# Patient Record
Sex: MALE | Race: WHITE | NOT HISPANIC OR LATINO | Employment: OTHER | URBAN - METROPOLITAN AREA
[De-identification: names, ages, dates, MRNs, and addresses within clinical notes are randomized per-mention and may not be internally consistent; named-entity substitution may affect disease eponyms.]

---

## 2023-02-22 LAB
ESTIMATED AVERAGE GLUCOSE FOR HBA1C: 131 MG/DL
HEMOGLOBIN A1C/HEMOGLOBIN TOTAL IN BLOOD: 6.2 %
TRIGLYCERIDE (MG/DL) IN SER/PLAS: 176 MG/DL (ref 0–149)

## 2023-08-22 LAB
ALANINE AMINOTRANSFERASE (SGPT) (U/L) IN SER/PLAS: 27 U/L (ref 10–52)
ALBUMIN (G/DL) IN SER/PLAS: 4.3 G/DL (ref 3.4–5)
ALKALINE PHOSPHATASE (U/L) IN SER/PLAS: 43 U/L (ref 33–136)
ANION GAP IN SER/PLAS: 16 MMOL/L (ref 10–20)
ASPARTATE AMINOTRANSFERASE (SGOT) (U/L) IN SER/PLAS: 26 U/L (ref 9–39)
BASOPHILS (10*3/UL) IN BLOOD BY AUTOMATED COUNT: 0.04 X10E9/L (ref 0–0.1)
BASOPHILS/100 LEUKOCYTES IN BLOOD BY AUTOMATED COUNT: 0.5 % (ref 0–2)
BILIRUBIN TOTAL (MG/DL) IN SER/PLAS: 0.8 MG/DL (ref 0–1.2)
CALCIDIOL (25 OH VITAMIN D3) (NG/ML) IN SER/PLAS: 41 NG/ML
CALCIUM (MG/DL) IN SER/PLAS: 9.6 MG/DL (ref 8.6–10.6)
CARBON DIOXIDE, TOTAL (MMOL/L) IN SER/PLAS: 23 MMOL/L (ref 21–32)
CHLORIDE (MMOL/L) IN SER/PLAS: 104 MMOL/L (ref 98–107)
CHOLESTEROL (MG/DL) IN SER/PLAS: 179 MG/DL (ref 0–199)
CHOLESTEROL IN HDL (MG/DL) IN SER/PLAS: 41.4 MG/DL
CHOLESTEROL/HDL RATIO: 4.3
CREATININE (MG/DL) IN SER/PLAS: 0.84 MG/DL (ref 0.5–1.3)
EOSINOPHILS (10*3/UL) IN BLOOD BY AUTOMATED COUNT: 0.18 X10E9/L (ref 0–0.4)
EOSINOPHILS/100 LEUKOCYTES IN BLOOD BY AUTOMATED COUNT: 2.4 % (ref 0–6)
ERYTHROCYTE DISTRIBUTION WIDTH (RATIO) BY AUTOMATED COUNT: 14.9 % (ref 11.5–14.5)
ERYTHROCYTE MEAN CORPUSCULAR HEMOGLOBIN CONCENTRATION (G/DL) BY AUTOMATED: 33.6 G/DL (ref 32–36)
ERYTHROCYTE MEAN CORPUSCULAR VOLUME (FL) BY AUTOMATED COUNT: 92 FL (ref 80–100)
ERYTHROCYTES (10*6/UL) IN BLOOD BY AUTOMATED COUNT: 4.78 X10E12/L (ref 4.5–5.9)
ESTIMATED AVERAGE GLUCOSE FOR HBA1C: 128 MG/DL
GFR MALE: 88 ML/MIN/1.73M2
GLUCOSE (MG/DL) IN SER/PLAS: 105 MG/DL (ref 74–99)
HEMATOCRIT (%) IN BLOOD BY AUTOMATED COUNT: 44.1 % (ref 41–52)
HEMOGLOBIN (G/DL) IN BLOOD: 14.8 G/DL (ref 13.5–17.5)
HEMOGLOBIN A1C/HEMOGLOBIN TOTAL IN BLOOD: 6.1 %
IMMATURE GRANULOCYTES/100 LEUKOCYTES IN BLOOD BY AUTOMATED COUNT: 1.6 % (ref 0–0.9)
LDL: 89 MG/DL (ref 0–99)
LEUKOCYTES (10*3/UL) IN BLOOD BY AUTOMATED COUNT: 7.6 X10E9/L (ref 4.4–11.3)
LYMPHOCYTES (10*3/UL) IN BLOOD BY AUTOMATED COUNT: 1.59 X10E9/L (ref 0.8–3)
LYMPHOCYTES/100 LEUKOCYTES IN BLOOD BY AUTOMATED COUNT: 21 % (ref 13–44)
MONOCYTES (10*3/UL) IN BLOOD BY AUTOMATED COUNT: 0.53 X10E9/L (ref 0.05–0.8)
MONOCYTES/100 LEUKOCYTES IN BLOOD BY AUTOMATED COUNT: 7 % (ref 2–10)
NEUTROPHILS (10*3/UL) IN BLOOD BY AUTOMATED COUNT: 5.12 X10E9/L (ref 1.6–5.5)
NEUTROPHILS/100 LEUKOCYTES IN BLOOD BY AUTOMATED COUNT: 67.5 % (ref 40–80)
NON HDL CHOLESTEROL: 138 MG/DL
NRBC (PER 100 WBCS) BY AUTOMATED COUNT: 0 /100 WBC (ref 0–0)
PLATELETS (10*3/UL) IN BLOOD AUTOMATED COUNT: 209 X10E9/L (ref 150–450)
POTASSIUM (MMOL/L) IN SER/PLAS: 4.6 MMOL/L (ref 3.5–5.3)
PROTEIN TOTAL: 7.2 G/DL (ref 6.4–8.2)
SODIUM (MMOL/L) IN SER/PLAS: 138 MMOL/L (ref 136–145)
THYROTROPIN (MIU/L) IN SER/PLAS BY DETECTION LIMIT <= 0.05 MIU/L: 1.27 MIU/L (ref 0.44–3.98)
TRIGLYCERIDE (MG/DL) IN SER/PLAS: 245 MG/DL (ref 0–149)
URATE (MG/DL) IN SER/PLAS: 7.2 MG/DL (ref 4–7.5)
UREA NITROGEN (MG/DL) IN SER/PLAS: 23 MG/DL (ref 6–23)
VLDL: 49 MG/DL (ref 0–40)

## 2023-10-09 ENCOUNTER — LAB (OUTPATIENT)
Dept: LAB | Facility: LAB | Age: 78
End: 2023-10-09
Payer: MEDICARE

## 2023-10-09 DIAGNOSIS — I77.810 THORACIC AORTIC ECTASIA (CMS-HCC): Primary | ICD-10-CM

## 2023-10-09 LAB
BUN SERPL-MCNC: 22 MG/DL (ref 6–23)
CREAT SERPL-MCNC: 0.88 MG/DL (ref 0.5–1.3)
GFR SERPL CREATININE-BSD FRML MDRD: 87 ML/MIN/1.73M*2

## 2023-10-09 PROCEDURE — 82565 ASSAY OF CREATININE: CPT

## 2023-10-09 PROCEDURE — 36415 COLL VENOUS BLD VENIPUNCTURE: CPT

## 2023-10-09 PROCEDURE — 84520 ASSAY OF UREA NITROGEN: CPT

## 2023-10-19 ENCOUNTER — HOSPITAL ENCOUNTER (OUTPATIENT)
Dept: RADIOLOGY | Facility: HOSPITAL | Age: 78
Discharge: HOME | End: 2023-10-19
Payer: MEDICARE

## 2023-10-19 DIAGNOSIS — I77.810 THORACIC AORTIC ECTASIA (CMS-HCC): ICD-10-CM

## 2023-10-19 PROCEDURE — 71275 CT ANGIOGRAPHY CHEST: CPT | Performed by: INTERNAL MEDICINE

## 2023-10-19 PROCEDURE — 2550000001 HC RX 255 CONTRASTS: Performed by: INTERNAL MEDICINE

## 2023-10-19 PROCEDURE — 71275 CT ANGIOGRAPHY CHEST: CPT

## 2023-10-19 RX ADMIN — IOHEXOL 90 ML: 350 INJECTION, SOLUTION INTRAVENOUS at 15:56

## 2023-10-31 DIAGNOSIS — I77.810 AORTIC ECTASIA, THORACIC (CMS-HCC): ICD-10-CM

## 2023-10-31 DIAGNOSIS — I27.20 PULMONARY HYPERTENSION (MULTI): Primary | ICD-10-CM

## 2023-11-08 DIAGNOSIS — N28.1 RENAL CYST, RIGHT: ICD-10-CM

## 2023-11-08 DIAGNOSIS — K76.89 LIVER CYST: Primary | ICD-10-CM

## 2023-11-14 ENCOUNTER — ANCILLARY PROCEDURE (OUTPATIENT)
Dept: RADIOLOGY | Facility: CLINIC | Age: 78
End: 2023-11-14
Payer: MEDICARE

## 2023-11-14 DIAGNOSIS — N28.1 RENAL CYST, RIGHT: ICD-10-CM

## 2023-11-14 DIAGNOSIS — K76.89 LIVER CYST: ICD-10-CM

## 2023-11-14 PROCEDURE — 76705 ECHO EXAM OF ABDOMEN: CPT | Performed by: RADIOLOGY

## 2023-11-14 PROCEDURE — 76705 ECHO EXAM OF ABDOMEN: CPT

## 2023-11-21 DIAGNOSIS — N28.1 ACQUIRED COMPLEX CYST OF KIDNEY: Primary | ICD-10-CM

## 2023-11-21 DIAGNOSIS — K76.9 LIVER LESION, RIGHT LOBE: ICD-10-CM

## 2023-11-28 ENCOUNTER — LAB (OUTPATIENT)
Dept: LAB | Facility: LAB | Age: 78
End: 2023-11-28
Payer: MEDICARE

## 2023-11-28 DIAGNOSIS — K76.9 LIVER LESION, RIGHT LOBE: ICD-10-CM

## 2023-11-28 DIAGNOSIS — N28.1 ACQUIRED COMPLEX CYST OF KIDNEY: ICD-10-CM

## 2023-11-28 LAB
CREAT SERPL-MCNC: 1.09 MG/DL (ref 0.5–1.3)
GFR SERPL CREATININE-BSD FRML MDRD: 69 ML/MIN/1.73M*2

## 2023-11-28 PROCEDURE — 36415 COLL VENOUS BLD VENIPUNCTURE: CPT

## 2023-11-28 PROCEDURE — 82565 ASSAY OF CREATININE: CPT

## 2023-12-07 ENCOUNTER — OFFICE VISIT (OUTPATIENT)
Dept: PRIMARY CARE | Facility: CLINIC | Age: 78
End: 2023-12-07
Payer: MEDICARE

## 2023-12-07 ENCOUNTER — LAB (OUTPATIENT)
Dept: LAB | Facility: LAB | Age: 78
End: 2023-12-07
Payer: MEDICARE

## 2023-12-07 VITALS
HEIGHT: 68 IN | BODY MASS INDEX: 44.67 KG/M2 | SYSTOLIC BLOOD PRESSURE: 128 MMHG | HEART RATE: 65 BPM | WEIGHT: 294.75 LBS | DIASTOLIC BLOOD PRESSURE: 70 MMHG | RESPIRATION RATE: 18 BRPM | OXYGEN SATURATION: 95 %

## 2023-12-07 DIAGNOSIS — E79.0 HYPERURICEMIA: ICD-10-CM

## 2023-12-07 DIAGNOSIS — C61 PROSTATE CANCER (MULTI): ICD-10-CM

## 2023-12-07 DIAGNOSIS — N39.41 URINARY INCONTINENCE, URGE: ICD-10-CM

## 2023-12-07 DIAGNOSIS — I27.20 PULMONARY HYPERTENSION (MULTI): ICD-10-CM

## 2023-12-07 DIAGNOSIS — R73.9 HYPERGLYCEMIA: ICD-10-CM

## 2023-12-07 DIAGNOSIS — I10 PRIMARY HYPERTENSION: Primary | ICD-10-CM

## 2023-12-07 PROCEDURE — 1159F MED LIST DOCD IN RCRD: CPT | Performed by: INTERNAL MEDICINE

## 2023-12-07 PROCEDURE — 3078F DIAST BP <80 MM HG: CPT | Performed by: INTERNAL MEDICINE

## 2023-12-07 PROCEDURE — 3074F SYST BP LT 130 MM HG: CPT | Performed by: INTERNAL MEDICINE

## 2023-12-07 PROCEDURE — 99214 OFFICE O/P EST MOD 30 MIN: CPT | Performed by: INTERNAL MEDICINE

## 2023-12-07 PROCEDURE — 84153 ASSAY OF PSA TOTAL: CPT

## 2023-12-07 PROCEDURE — 84154 ASSAY OF PSA FREE: CPT

## 2023-12-07 PROCEDURE — 36415 COLL VENOUS BLD VENIPUNCTURE: CPT

## 2023-12-07 RX ORDER — CLINDAMYCIN HYDROCHLORIDE 300 MG/1
CAPSULE ORAL
COMMUNITY
End: 2024-02-13

## 2023-12-07 RX ORDER — CHOLECALCIFEROL (VITAMIN D3) 25 MCG
1 TABLET ORAL 2 TIMES DAILY
COMMUNITY
Start: 2015-02-25

## 2023-12-07 RX ORDER — MULTIVITAMIN
TABLET ORAL
COMMUNITY

## 2023-12-07 RX ORDER — SILDENAFIL 100 MG/1
TABLET, FILM COATED ORAL
COMMUNITY
Start: 2018-08-21

## 2023-12-07 RX ORDER — METFORMIN HYDROCHLORIDE 500 MG/1
TABLET, EXTENDED RELEASE ORAL
COMMUNITY
End: 2024-04-17 | Stop reason: SDUPTHER

## 2023-12-07 ASSESSMENT — COLUMBIA-SUICIDE SEVERITY RATING SCALE - C-SSRS
1. IN THE PAST MONTH, HAVE YOU WISHED YOU WERE DEAD OR WISHED YOU COULD GO TO SLEEP AND NOT WAKE UP?: NO
6. HAVE YOU EVER DONE ANYTHING, STARTED TO DO ANYTHING, OR PREPARED TO DO ANYTHING TO END YOUR LIFE?: NO
2. HAVE YOU ACTUALLY HAD ANY THOUGHTS OF KILLING YOURSELF?: NO

## 2023-12-07 ASSESSMENT — ENCOUNTER SYMPTOMS
PALPITATIONS: 0
LOSS OF SENSATION IN FEET: 0
OCCASIONAL FEELINGS OF UNSTEADINESS: 0
DEPRESSION: 0

## 2023-12-07 ASSESSMENT — PATIENT HEALTH QUESTIONNAIRE - PHQ9
2. FEELING DOWN, DEPRESSED OR HOPELESS: NOT AT ALL
1. LITTLE INTEREST OR PLEASURE IN DOING THINGS: NOT AT ALL
SUM OF ALL RESPONSES TO PHQ9 QUESTIONS 1 AND 2: 0

## 2023-12-07 NOTE — PATIENT INSTRUCTIONS
Continue working on control carbs to reduce weight and triglycerides, complete MRIs call office for results, see cardiologist, return here in

## 2023-12-07 NOTE — PROGRESS NOTES
"Subjective   Patient ID: Joseph Hayden is a 78 y.o. male who presents for Hypertension., vaccines, results of ct angio    Continue watching his amount of carbs. Wearing stockings that have improved his bialteral edema  We discussed labs from last visit, continue metformin 1500 mg , hmag1 stable  Uric acid stable on 200 mg of allopurinol  Base in findings of ct angio and pulmonary hypertension he will see cardiologist in 2 weeks with dr Horne.He wonders if this finding was related to covid vaccine received in  October before her angio.  Also he had subsequent abdominal ultrasound that showed renal cyst and liver lesion, follow up mri of kidney and liver coming up next week.  Wants opinion about RSV  He uses urinary pad , mostly at night, no urinary retention,  no flow changes. No rectal bleeding, BM daily soft.Nocturia about 4 x week      Review of Systems   Respiratory:          Pt denies sob at exertion, orthopnea, or cough   Cardiovascular:  Negative for chest pain and palpitations.   All other systems reviewed and are negative.      Objective   /70 (BP Location: Left arm, Patient Position: Sitting, BP Cuff Size: Adult)   Pulse 65   Resp 18   Ht 1.727 m (5' 8\")   Wt 134 kg (294 lb 12.1 oz)   SpO2 95%   BMI 44.82 kg/m²     Physical Exam  Eyes- Conjunctiva clear  HEENT no sinus tenderness  Neck-no thyromegaly, thick neck   Cardiac- regular rate and rhythm, no murmurs  Lung- clear to auscultation  GI- present  bowel sounds, nontender, no rebound  MSK- no deformities  Extremities- wearing compressin stockings, still present edema,  Neuro- non focal, oriented x 3  Psychiatric- pleasant, well groomed, no hallucinations      Assessment/Plan   Problem List Items Addressed This Visit             ICD-10-CM       Cardiac and Vasculature    Pulmonary hypertension (CMS/HCC) I27.20     Pending cardilogy eval, pt declined sleep study in the past         Primary hypertension - Primary I10     Control on current med   "          Endocrine/Metabolic    Hyperglycemia R73.9       Genitourinary and Reproductive    Urinary incontinence, urge N39.41     Declines going back to urolgist for further meds            Hematology and Neoplasia    Prostate cancer (CMS/HCC) C61    Relevant Orders    PSA, total and free       Musculoskeletal and Injuries    Hyperuricemia E79.0     Continue current dose

## 2023-12-10 LAB
PSA FREE MFR SERPL: <33 %
PSA FREE SERPL-MCNC: <0.1 NG/ML
PSA SERPL IA-MCNC: 0.3 NG/ML (ref 0–4)

## 2023-12-12 ENCOUNTER — HOSPITAL ENCOUNTER (OUTPATIENT)
Dept: RADIOLOGY | Facility: HOSPITAL | Age: 78
Discharge: HOME | End: 2023-12-12
Payer: MEDICARE

## 2023-12-12 DIAGNOSIS — N28.1 ACQUIRED COMPLEX CYST OF KIDNEY: ICD-10-CM

## 2023-12-12 DIAGNOSIS — K76.9 LIVER LESION, RIGHT LOBE: ICD-10-CM

## 2023-12-12 PROCEDURE — 2550000001 HC RX 255 CONTRASTS: Performed by: INTERNAL MEDICINE

## 2023-12-12 PROCEDURE — 74183 MRI ABD W/O CNTR FLWD CNTR: CPT | Performed by: RADIOLOGY

## 2023-12-12 PROCEDURE — A9575 INJ GADOTERATE MEGLUMI 0.1ML: HCPCS | Performed by: INTERNAL MEDICINE

## 2023-12-12 PROCEDURE — 74183 MRI ABD W/O CNTR FLWD CNTR: CPT

## 2023-12-12 RX ORDER — GADOTERATE MEGLUMINE 376.9 MG/ML
15 INJECTION INTRAVENOUS
Status: COMPLETED | OUTPATIENT
Start: 2023-12-12 | End: 2023-12-12

## 2023-12-12 RX ADMIN — GADOTERATE MEGLUMINE 26 ML: 376.9 INJECTION INTRAVENOUS at 17:30

## 2023-12-19 ENCOUNTER — TELEPHONE (OUTPATIENT)
Dept: PRIMARY CARE | Facility: CLINIC | Age: 78
End: 2023-12-19

## 2023-12-19 ENCOUNTER — OFFICE VISIT (OUTPATIENT)
Dept: CARDIOLOGY | Facility: CLINIC | Age: 78
End: 2023-12-19
Payer: MEDICARE

## 2023-12-19 VITALS
OXYGEN SATURATION: 94 % | DIASTOLIC BLOOD PRESSURE: 83 MMHG | HEART RATE: 64 BPM | WEIGHT: 300 LBS | BODY MASS INDEX: 43.05 KG/M2 | SYSTOLIC BLOOD PRESSURE: 156 MMHG

## 2023-12-19 DIAGNOSIS — I48.91 ATRIAL FIBRILLATION, UNSPECIFIED TYPE (MULTI): ICD-10-CM

## 2023-12-19 DIAGNOSIS — R94.31 ABNORMAL EKG: Primary | ICD-10-CM

## 2023-12-19 DIAGNOSIS — I77.810 AORTIC ECTASIA, THORACIC (CMS-HCC): ICD-10-CM

## 2023-12-19 PROCEDURE — 93000 ELECTROCARDIOGRAM COMPLETE: CPT | Performed by: INTERNAL MEDICINE

## 2023-12-19 PROCEDURE — 99204 OFFICE O/P NEW MOD 45 MIN: CPT | Performed by: INTERNAL MEDICINE

## 2023-12-19 PROCEDURE — 1159F MED LIST DOCD IN RCRD: CPT | Performed by: INTERNAL MEDICINE

## 2023-12-19 PROCEDURE — 3077F SYST BP >= 140 MM HG: CPT | Performed by: INTERNAL MEDICINE

## 2023-12-19 PROCEDURE — 3079F DIAST BP 80-89 MM HG: CPT | Performed by: INTERNAL MEDICINE

## 2023-12-19 NOTE — PATIENT INSTRUCTIONS
Today reviewed ECG shows atrial fibrillation.  Increased stroke risk and recommend Eliquis.  We will obtain echocardiogram and recommend elective cardioversion in about 1 month after being on Eliquis.  Please do not miss any doses.    We reviewed your recent CT scan in which the right pulmonary artery is enlarged, although this is nonspecific and probably body habitus related.  Echocardiogram may give us some more insight into whether there is any right-sided enlargement    Follow-up TBD based after cardioversion

## 2023-12-19 NOTE — PROGRESS NOTES
Primary Care Physician: Rochelle Pinzon MD  Date of Visit: 2023  2:00 PM EST      Chief Complaint:   Pt referred by Dr. Pinzon for ?pulmonary HTN     HPI / Summary:   Joseph Hayden is a 78 y.o. male Htn, gout, obesity referred regarding abnormal CT scan and concern for pulmonary hypertension.  Right pulmonary artery measuring around 3 cm on CT chest.  Not noted previously.  Denies any chest pain or shortness of breath.  No lightheadedness, dizziness, presyncope or syncope.  Ambulation limited by body habitus and cane as well as musculoskeletal complaints.    Of note EKG today showing atrial fibrillation      Exercise: none regularly    CT angio chest : ascending aorta 3.9 cm. Isolated dilation of right pulmonary artery  CT angio chest : ascending aorta 3.9 cm.   CT chest : ascending aorta 4 cm  CT chest : ascending aorta 4.1 cm      Last Cardiology Tests:  EC2023: A-fib with controlled rate, low voltage  2019: NSR with HR 89 bpm, long first-degree AVB ( MS) with some PACs    Echo 10/11/2022:  EF 60%, stage I DD    Cath:  N/a    Stress Test:  N/a    Cardiac Imaging:      Past Medical History:  Past Medical History:   Diagnosis Date    Abrasion of right middle finger, initial encounter 2021    Abrasion of right middle finger    Aftercare following joint replacement surgery 2019    Aftercare following right hip joint replacement surgery    Cellulitis of unspecified part of limb 2018    Cellulitis of forearm    Encounter for general adult medical examination without abnormal findings 2019    Encounter for preventive health examination    Encounter for immunization 2021    Encounter for immunization    Other conditions influencing health status 2019    Hamstring sprain, left, initial encounter    Other specified disorders of bone, shoulder 2017    Pain of left clavicle    Pain in unspecified hip 2018    Hip pain    Personal  Pt arrives with requesting STD testing due to having sores in her noel area. Pt states they started two days ago, no known exposure to STDs, denies them being painful.     Triage Assessment     Row Name 09/03/22 1030       Triage Assessment (Adult)    Airway WDL WDL       Respiratory WDL    Respiratory WDL WDL       Skin Circulation/Temperature WDL    Skin Circulation/Temperature WDL X  reports sores in noel area       Cardiac WDL    Cardiac WDL WDL       Peripheral/Neurovascular WDL    Peripheral Neurovascular WDL WDL       Cognitive/Neuro/Behavioral WDL    Cognitive/Neuro/Behavioral WDL WDL               history of diseases of the skin and subcutaneous tissue 10/12/2016    History of contact dermatitis    Personal history of other diseases of the digestive system 02/15/2018    History of rectal bleeding    Personal history of other diseases of the musculoskeletal system and connective tissue 09/15/2015    History of joint pain    Personal history of other diseases of urinary system 06/27/2019    History of hematuria    Rash and other nonspecific skin eruption 10/03/2014    Erythematous rash    Unilateral primary osteoarthritis, right hip 01/25/2019    Osteoarthritis of right hip    Urinary tract infection, site not specified 06/27/2019    Acute UTI        Past Surgical History:  Past Surgical History:   Procedure Laterality Date    COLONOSCOPY  05/28/2013    Complete Colonoscopy          Social History:   reports that he has quit smoking. His smoking use included cigarettes. He has never used smokeless tobacco.     Family History:  family history is not on file.      Allergies:  Allergies   Allergen Reactions    Penicillins Other       Outpatient Medications:  Current Outpatient Medications   Medication Instructions    allopurinol (ZYLOPRIM) 200 mg, oral, Daily    amLODIPine (NORVASC) 5 mg, oral, Daily, as directed    cholecalciferol (Vitamin D-3) 25 MCG (1000 UT) tablet 1 tablet, oral, 2 times daily    clindamycin (Cleocin) 300 mg capsule TAKE 2 CAPSULES BY MOUTH 1 HOUR PRIOR TO DENTAL PROCEDURE.    hydroCHLOROthiazide (HYDRODIURIL) 12.5 mg, oral, Daily    lisinopril 20 mg, oral, Daily    metFORMIN  mg 24 hr tablet TAKE ONE TABLET BY MOUTH daily in the MORNING and then take two tablets in the evening    multivitamin tablet oral    sildenafil (Viagra) 100 mg tablet oral       Review of Systems:  A complete 10 point ROS was performed and is negative except for HPI    Physical Exam:  GENERAL: alert, cooperative, pleasant, in no acute distress  SKIN: warm, dry, no rash.  NECK: no JVD, no DILIA  CARDIAC: irregular  "rate and rhythm with no rubs, murmurs, or gallops  CHEST: Normal respiratory efforts, lungs clear to auscultation bilaterally.  ABDOMEN: soft, nontender, nondistended  EXTREMITIES: no edema  NEURO: Alert and oriented x 3.  Grossly normal.  Moves all 4 extremities.    Vitals:    12/19/23 1400   BP: 156/83   BP Location: Left arm   Pulse: 64   SpO2: 94%   Weight: 136 kg (300 lb)     Wt Readings from Last 5 Encounters:   12/07/23 134 kg (294 lb 12.1 oz)   12/12/23 132 kg (292 lb)   05/23/23 135 kg (297 lb)   02/22/23 136 kg (299 lb 6 oz)   10/11/22 135 kg (298 lb)     Body mass index is 43.05 kg/m².        Last Labs:  CMP:  Recent Labs     11/28/23  0845 10/09/23  1411 08/22/23  1419 07/13/22  1210 09/15/21  1205   NA  --   --  138 138 137   K  --   --  4.6 4.6 4.5   CL  --   --  104 105 105   CO2  --   --  23 23 23   ANIONGAP  --   --  16 15 14   BUN  --  22 23 24* 20   CREATININE 1.09 0.88 0.84 0.93 0.93   EGFR 69 87  --   --   --    GLUCOSE  --   --  105* 123* 122*     Recent Labs     08/22/23  1419 07/13/22  1210 09/15/21  1205   ALBUMIN 4.3 4.2 4.3   ALKPHOS 43 37 41   ALT 27 40 38   AST 26 35 33   BILITOT 0.8 0.7 0.7     CBC:  Recent Labs     08/22/23  1419 07/13/22  1210 09/15/21  1205   WBC 7.6 6.9 7.2   HGB 14.8 14.1 14.2   HCT 44.1 42.5 41.9    217 213   MCV 92 90 91     COAG:   Recent Labs     02/04/19  1033   INR 1.1     ENDO:  Recent Labs     08/22/23  1419 02/22/23  1310 07/13/22  1210 01/26/22  1240 09/15/21  1205 04/23/21  1145 09/09/20  0857   TSH 1.27  --  0.91  --  0.85  --  1.97   HGBA1C 6.1* 6.2* 6.0* 6.3* 6.1*   < > 5.9    < > = values in this interval not displayed.      CARDIAC: No results for input(s): \"CKMB\", \"TROPHS\", \"BNP\" in the last 48852 hours.    No lab exists for component: \"CK\", \"CKMBP\"  Recent Labs     08/22/23  1419 02/22/23  1310 07/13/22  1210 01/26/22  1240 09/15/21  1205   CHOL 179  --  178  --  169   LDLF 89  --  93  --  86   HDL 41.4  --  39.8*  --  40.9   TRIG 245* 176* " 224*   < > 210*    < > = values in this interval not displayed.     No data recorded            Assessment/Plan     Minimal ascending aortic enlargement. No significant concern. And stable in size the past 4 years.  Probably normal variant for him.  Isolated right pulmonary artery enlargement is nonspecific.  My suspicion is low for pulmonary hypertension.  Will assess with echocardiogram.    New onset atrial fibrillation seen on ECG today.  No attributable symptoms.  He had first-degree AV block with very long CO interval in 2019 so a little bit difficult to differentiate but I think this is A-fib today.  I reviewed with Dr. Canales as a side consult and he also agrees likely A-fib.  We will assess with echocardiogram which might give us some better insight into E and A waves and we will plan for elective cardioversion in about 1 month.  Start Eliquis 5 mg twice a day.  Heart rate is well-controlled and does not need richard blocker.  [id number] score of at least 3    Follow-up TBD after cardioversion    Followup Appts:  Future Appointments   Date Time Provider Department Center   2/20/2024  2:00 PM Ozzie Nguyen DO [id number] [facility]   4/17/2024  9:00 AM Rochelle Pinzon MD [id number] [facility]           ____________________________________________________________  Ozzie Nguyen DO  [facility]  [facility]

## 2024-01-12 ENCOUNTER — HOSPITAL ENCOUNTER (OUTPATIENT)
Dept: CARDIOLOGY | Facility: HOSPITAL | Age: 79
Discharge: HOME | End: 2024-01-12
Payer: MEDICARE

## 2024-01-12 DIAGNOSIS — R94.31 ABNORMAL EKG: ICD-10-CM

## 2024-01-12 LAB
AORTIC VALVE MEAN GRADIENT: 3.7
AORTIC VALVE PEAK VELOCITY: 1.4
AV PEAK GRADIENT: 7.8
AVA (PEAK VEL): 2.28
AVA (VTI): 2.56
EJECTION FRACTION APICAL 4 CHAMBER: 61.7
EJECTION FRACTION: 64
LEFT ATRIUM VOLUME AREA LENGTH INDEX BSA: 23.6
LEFT VENTRICLE INTERNAL DIMENSION DIASTOLE: 5.21 (ref 3.5–6)
LEFT VENTRICULAR OUTFLOW TRACT DIAMETER: 2
RIGHT VENTRICLE PEAK SYSTOLIC PRESSURE: 46.4
TRICUSPID ANNULAR PLANE SYSTOLIC EXCURSION: 2.6

## 2024-01-12 PROCEDURE — 93306 TTE W/DOPPLER COMPLETE: CPT

## 2024-01-12 PROCEDURE — 93306 TTE W/DOPPLER COMPLETE: CPT | Performed by: INTERNAL MEDICINE

## 2024-01-12 PROCEDURE — 2500000004 HC RX 250 GENERAL PHARMACY W/ HCPCS (ALT 636 FOR OP/ED): Performed by: INTERNAL MEDICINE

## 2024-01-12 RX ADMIN — PERFLUTREN 2 ML OF DILUTION: 6.52 INJECTION, SUSPENSION INTRAVENOUS at 15:10

## 2024-01-18 ENCOUNTER — ANESTHESIA (OUTPATIENT)
Dept: CARDIOLOGY | Facility: HOSPITAL | Age: 79
End: 2024-01-18
Payer: MEDICARE

## 2024-01-18 ENCOUNTER — HOSPITAL ENCOUNTER (OUTPATIENT)
Dept: CARDIOLOGY | Facility: HOSPITAL | Age: 79
Discharge: HOME | End: 2024-01-18
Payer: MEDICARE

## 2024-01-18 ENCOUNTER — ANESTHESIA EVENT (OUTPATIENT)
Dept: CARDIOLOGY | Facility: HOSPITAL | Age: 79
End: 2024-01-18
Payer: MEDICARE

## 2024-01-18 ENCOUNTER — APPOINTMENT (OUTPATIENT)
Dept: CARDIOLOGY | Facility: HOSPITAL | Age: 79
End: 2024-01-18
Payer: MEDICARE

## 2024-01-18 VITALS
RESPIRATION RATE: 20 BRPM | DIASTOLIC BLOOD PRESSURE: 69 MMHG | WEIGHT: 299.83 LBS | HEIGHT: 70 IN | TEMPERATURE: 98.1 F | HEART RATE: 85 BPM | SYSTOLIC BLOOD PRESSURE: 146 MMHG | OXYGEN SATURATION: 94 % | BODY MASS INDEX: 42.92 KG/M2

## 2024-01-18 DIAGNOSIS — I48.91 ATRIAL FIBRILLATION, UNSPECIFIED TYPE (MULTI): ICD-10-CM

## 2024-01-18 LAB
ANION GAP SERPL CALC-SCNC: 15 MMOL/L (ref 10–20)
BODY SURFACE AREA: 2.59 M2
BUN SERPL-MCNC: 24 MG/DL (ref 6–23)
CALCIUM SERPL-MCNC: 8.5 MG/DL (ref 8.6–10.3)
CHLORIDE SERPL-SCNC: 106 MMOL/L (ref 98–107)
CO2 SERPL-SCNC: 19 MMOL/L (ref 21–32)
CREAT SERPL-MCNC: 0.95 MG/DL (ref 0.5–1.3)
EGFRCR SERPLBLD CKD-EPI 2021: 81 ML/MIN/1.73M*2
ERYTHROCYTE [DISTWIDTH] IN BLOOD BY AUTOMATED COUNT: 14.9 % (ref 11.5–14.5)
GLUCOSE SERPL-MCNC: 106 MG/DL (ref 74–99)
HCT VFR BLD AUTO: 39.1 % (ref 41–52)
HGB BLD-MCNC: 13.1 G/DL (ref 13.5–17.5)
MCH RBC QN AUTO: 30.8 PG (ref 26–34)
MCHC RBC AUTO-ENTMCNC: 33.5 G/DL (ref 32–36)
MCV RBC AUTO: 92 FL (ref 80–100)
NRBC BLD-RTO: 0 /100 WBCS (ref 0–0)
PLATELET # BLD AUTO: 184 X10*3/UL (ref 150–450)
POTASSIUM SERPL-SCNC: 4.3 MMOL/L (ref 3.5–5.3)
RBC # BLD AUTO: 4.26 X10*6/UL (ref 4.5–5.9)
SODIUM SERPL-SCNC: 136 MMOL/L (ref 136–145)
WBC # BLD AUTO: 6.6 X10*3/UL (ref 4.4–11.3)

## 2024-01-18 PROCEDURE — 92960 CARDIOVERSION ELECTRIC EXT: CPT

## 2024-01-18 PROCEDURE — 92960 CARDIOVERSION ELECTRIC EXT: CPT | Performed by: INTERNAL MEDICINE

## 2024-01-18 PROCEDURE — 85027 COMPLETE CBC AUTOMATED: CPT | Performed by: INTERNAL MEDICINE

## 2024-01-18 PROCEDURE — 80048 BASIC METABOLIC PNL TOTAL CA: CPT | Performed by: INTERNAL MEDICINE

## 2024-01-18 PROCEDURE — 93010 ELECTROCARDIOGRAM REPORT: CPT | Performed by: INTERNAL MEDICINE

## 2024-01-18 PROCEDURE — 3700000002 HC GENERAL ANESTHESIA TIME - EACH INCREMENTAL 1 MINUTE: Performed by: ANESTHESIOLOGY

## 2024-01-18 PROCEDURE — 7100000010 HC PHASE TWO TIME - EACH INCREMENTAL 1 MINUTE: Performed by: ANESTHESIOLOGY

## 2024-01-18 PROCEDURE — 99100 ANES PT EXTEME AGE<1 YR&>70: CPT | Performed by: ANESTHESIOLOGY

## 2024-01-18 PROCEDURE — 7100000009 HC PHASE TWO TIME - INITIAL BASE CHARGE: Performed by: ANESTHESIOLOGY

## 2024-01-18 PROCEDURE — 93248 EXT ECG>7D<15D REV&INTERPJ: CPT | Performed by: INTERNAL MEDICINE

## 2024-01-18 PROCEDURE — 2500000004 HC RX 250 GENERAL PHARMACY W/ HCPCS (ALT 636 FOR OP/ED): Performed by: ANESTHESIOLOGIST ASSISTANT

## 2024-01-18 PROCEDURE — 36415 COLL VENOUS BLD VENIPUNCTURE: CPT | Performed by: INTERNAL MEDICINE

## 2024-01-18 PROCEDURE — 3700000001 HC GENERAL ANESTHESIA TIME - INITIAL BASE CHARGE: Performed by: ANESTHESIOLOGY

## 2024-01-18 PROCEDURE — 93246 EXT ECG>7D<15D RECORDING: CPT

## 2024-01-18 PROCEDURE — 99203 OFFICE O/P NEW LOW 30 MIN: CPT | Performed by: NURSE PRACTITIONER

## 2024-01-18 PROCEDURE — 2500000004 HC RX 250 GENERAL PHARMACY W/ HCPCS (ALT 636 FOR OP/ED): Performed by: NURSE PRACTITIONER

## 2024-01-18 PROCEDURE — A92960 PR CARDIOVERSION, ELECTIVE;EXTERN: Performed by: ANESTHESIOLOGIST ASSISTANT

## 2024-01-18 PROCEDURE — A92960 PR CARDIOVERSION, ELECTIVE;EXTERN: Performed by: ANESTHESIOLOGY

## 2024-01-18 RX ORDER — SODIUM CHLORIDE 9 MG/ML
100 INJECTION, SOLUTION INTRAVENOUS CONTINUOUS
Status: DISCONTINUED | OUTPATIENT
Start: 2024-01-18 | End: 2024-01-19 | Stop reason: HOSPADM

## 2024-01-18 RX ORDER — PROPOFOL 10 MG/ML
INJECTION, EMULSION INTRAVENOUS AS NEEDED
Status: DISCONTINUED | OUTPATIENT
Start: 2024-01-18 | End: 2024-01-18

## 2024-01-18 RX ADMIN — PROPOFOL 60 MG: 10 INJECTION, EMULSION INTRAVENOUS at 08:45

## 2024-01-18 RX ADMIN — SODIUM CHLORIDE: 9 INJECTION, SOLUTION INTRAVENOUS at 08:42

## 2024-01-18 ASSESSMENT — ENCOUNTER SYMPTOMS
CONSTITUTIONAL NEGATIVE: 1
GASTROINTESTINAL NEGATIVE: 1
RESPIRATORY NEGATIVE: 1
NEUROLOGICAL NEGATIVE: 1
MUSCULOSKELETAL NEGATIVE: 1
EYES NEGATIVE: 1
ALLERGIC/IMMUNOLOGIC NEGATIVE: 1
PSYCHIATRIC NEGATIVE: 1
CARDIOVASCULAR NEGATIVE: 1
HEMATOLOGIC/LYMPHATIC NEGATIVE: 1
ENDOCRINE NEGATIVE: 1

## 2024-01-18 ASSESSMENT — COLUMBIA-SUICIDE SEVERITY RATING SCALE - C-SSRS
2. HAVE YOU ACTUALLY HAD ANY THOUGHTS OF KILLING YOURSELF?: NO
1. IN THE PAST MONTH, HAVE YOU WISHED YOU WERE DEAD OR WISHED YOU COULD GO TO SLEEP AND NOT WAKE UP?: NO
6. HAVE YOU EVER DONE ANYTHING, STARTED TO DO ANYTHING, OR PREPARED TO DO ANYTHING TO END YOUR LIFE?: NO

## 2024-01-18 NOTE — ANESTHESIA POSTPROCEDURE EVALUATION
Patient: Joseph Hayden    Procedure Summary       Date: 01/18/24 Room / Location: [facility]    Anesthesia Start: 0842 Anesthesia Stop: 0900    Procedure: CARDIOVERSION EXTERNAL Diagnosis: Atrial fibrillation, unspecified type (CMS/[facility])    Scheduled Providers: Ozzie Nguyen DO; Adi Torres MD; TANMAY Marks Responsible Provider: Adi Torres MD    Anesthesia Type: general ASA Status: 3            Anesthesia Type: general        Anesthesia Post Evaluation    Patient location during evaluation: PACU  Patient participation: complete - patient participated  Level of consciousness: awake and alert  Pain management: adequate  Airway patency: patent  Cardiovascular status: acceptable and hemodynamically stable  Respiratory status: acceptable, spontaneous ventilation and nonlabored ventilation  Hydration status: acceptable  Postoperative Nausea and Vomiting: none        No notable events documented.

## 2024-01-18 NOTE — ANESTHESIA PROCEDURE NOTES
Airway  Date/Time: 1/18/2024 8:45 AM    Staffing  Performed: TANMAY   Authorized by: Adi Torres MD    Performed by: TANMAY Marks    Final Airway Details  Final airway type: mask

## 2024-01-18 NOTE — ANESTHESIA PREPROCEDURE EVALUATION
Patient: Joseph Hayden    Procedure Information       Date/Time: 01/18/24 0815    Scheduled providers: Ozzie Nguyen DO; Adi Torres MD; TANMAY Marks    Procedure: CARDIOVERSION EXTERNAL    Location: [facility]            Relevant Problems   Cardiovascular   (+) Primary hypertension   (+) Pulmonary hypertension (CMS/HCC)      Pulmonary   (+) Pulmonary hypertension (CMS/HCC)       Clinical information reviewed:                   No data recorded       Past Medical History:   Diagnosis Date    Abrasion of right middle finger, initial encounter 04/23/2021    Abrasion of right middle finger    Aftercare following joint replacement surgery 03/28/2019    Aftercare following right hip joint replacement surgery    Cellulitis of unspecified part of limb 11/07/2018    Cellulitis of forearm    Encounter for general adult medical examination without abnormal findings 08/13/2019    Encounter for preventive health examination    Encounter for immunization 04/23/2021    Encounter for immunization    Other conditions influencing health status 05/07/2019    Hamstring sprain, left, initial encounter    Other specified disorders of bone, shoulder 12/05/2017    Pain of left clavicle    Pain in unspecified hip 08/21/2018    Hip pain    Personal history of diseases of the skin and subcutaneous tissue 10/12/2016    History of contact dermatitis    Personal history of other diseases of the digestive system 02/15/2018    History of rectal bleeding    Personal history of other diseases of the musculoskeletal system and connective tissue 09/15/2015    History of joint pain    Personal history of other diseases of urinary system 06/27/2019    History of hematuria    Rash and other nonspecific skin eruption 10/03/2014    Erythematous rash    Unilateral primary osteoarthritis, right hip 01/25/2019    Osteoarthritis of right hip    Urinary tract infection, site not specified 06/27/2019    Acute UTI      Past Surgical  History:   Procedure Laterality Date    COLONOSCOPY  05/28/2013    Complete Colonoscopy     Social History     Tobacco Use    Smoking status: Former     Types: Cigarettes    Smokeless tobacco: Never      Current Outpatient Medications   Medication Instructions    allopurinol (ZYLOPRIM) 200 mg, oral, Daily    amLODIPine (NORVASC) 5 mg, oral, Daily, as directed    apixaban (ELIQUIS) 5 mg, oral, 2 times daily    cholecalciferol (Vitamin D-3) 25 MCG (1000 UT) tablet 1 tablet, oral, 2 times daily    clindamycin (Cleocin) 300 mg capsule TAKE 2 CAPSULES BY MOUTH 1 HOUR PRIOR TO DENTAL PROCEDURE.    hydroCHLOROthiazide (HYDRODIURIL) 12.5 mg, oral, Daily    lisinopril 20 mg, oral, Daily    metFORMIN  mg 24 hr tablet TAKE ONE TABLET BY MOUTH daily in the MORNING and then take two tablets in the evening    multivitamin tablet oral    sildenafil (Viagra) 100 mg tablet oral      Allergies   Allergen Reactions    Penicillins Other        Chemistry    Lab Results   Component Value Date/Time     08/22/2023 1419    K 4.6 08/22/2023 1419     08/22/2023 1419    CO2 23 08/22/2023 1419    BUN 22 10/09/2023 1411    CREATININE 1.09 11/28/2023 0845    Lab Results   Component Value Date/Time    CALCIUM 9.6 08/22/2023 1419    ALKPHOS 43 08/22/2023 1419    AST 26 08/22/2023 1419    ALT 27 08/22/2023 1419    BILITOT 0.8 08/22/2023 1419          Lab Results   Component Value Date/Time    WBC 7.6 08/22/2023 1419    HGB 14.8 08/22/2023 1419    HCT 44.1 08/22/2023 1419     08/22/2023 1419     Lab Results   Component Value Date/Time    PROTIME 12.1 02/04/2019 1033    INR 1.1 02/04/2019 1033     Encounter Date: 12/19/23   ECG 12 lead (Clinic Performed)    Narrative    Atrial fibrillation with controlled ventricular response, low voltage     No results found for this or any previous visit from the past 1095 days.   Echo 12/19/2023:  Left Ventricle: The left ventricular systolic function is normal, with an estimated ejection  fraction of 55-60%. The patient is in atrial fibrillation which may influence the estimate of left ventricular function and transvalvular flows. There are no regional wall motion abnormalities. The left ventricular cavity size is normal. Left ventricular diastolic filling was indeterminate.  Left Atrium: The left atrium is normal in size.  Right Ventricle: The right ventricle is mildly enlarged. There is mildly reduced right ventricular systolic function.  Right Atrium: The right atrium is mildly dilated.  Aortic Valve: The aortic valve appears structurally normal. There is trace to mild aortic valve regurgitation. The peak instantaneous gradient of the aortic valve is 7.8 mmHg. The mean gradient of the aortic valve is 3.7 mmHg.  Mitral Valve: The mitral valve is mildly thickened. There is mild mitral valve regurgitation.  Tricuspid Valve: The tricuspid valve is structurally normal. No evidence of tricuspid regurgitation.  Pulmonic Valve: The pulmonic valve is structurally normal. There is no indication of pulmonic valve regurgitation.  Pericardium: There is no pericardial effusion noted.  Aorta: The aortic root is normal. Ascending aortal mildly enlarged at 4.0 cm.  In comparison to the previous echocardiogram(s): Compared with study from 10/11/2022, atrial fibrillation now present.        CONCLUSIONS:   1. Left ventricular systolic function is normal with a 55-60% estimated ejection fraction.   2. There is mildly reduced right ventricular systolic function.   3. Mild mitral valve regurgitation.   4. Ascending aortal mildly enlarged at 4.0 cm.   5. The patient is in atrial fibrillation which may influence the estimate of left ventricular function and transvalvular flows.    Visit Vitals  Smoking Status Former        Physical Exam    Airway  Mallampati: III  TM distance: >3 FB  Neck ROM: full     Cardiovascular   Rhythm: regular  Rate: normal     Dental - normal exam     Pulmonary   Breath sounds clear to  auscultation     Abdominal - normal exam              Anesthesia Plan    History of general anesthesia?: yes  History of complications of general anesthesia?: no    ASA 3     general   (TIVA)  intravenous induction   Anesthetic plan and risks discussed with patient.    Plan discussed with CRNA and CAA.

## 2024-01-18 NOTE — H&P
History Of Present Illness  Joseph Hayden is a 78 y.o. male presenting with atrial fibrillation.     Past Medical History  He has a past medical history of Atrial fibrillation (CMS/HCC), Diabetes mellitus (CMS/HCC), HTN (hypertension), ANTONIO (obstructive sleep apnea), and Prostate cancer (CMS/HCC).    Surgical History  He has a past surgical history that includes Colonoscopy (05/28/2013); Hip Arthroplasty (Right, 02/19/2019); and Cataract extraction w/  intraocular lens implant (Bilateral).     Social History  He reports that he has quit smoking. His smoking use included cigarettes. He has never used smokeless tobacco. No history on file for alcohol use and drug use.    Family History  No family history on file.     Allergies  Penicillins, Tamsulosin, and Adhesive tape-silicones    Home Medications  Current Outpatient Medications on File Prior to Encounter   Medication Sig Dispense Refill    allopurinol (Zyloprim) 100 mg tablet TAKE TWO TABLETS BY MOUTH DAILY. 180 tablet 1    amLODIPine (Norvasc) 5 mg tablet TAKE 1 TABLET BY MOUTH DAILY AS DIRECTED 90 tablet 1    apixaban (Eliquis) 5 mg tablet Take 1 tablet (5 mg) by mouth 2 times a day. 60 tablet 11    cholecalciferol (Vitamin D-3) 25 MCG (1000 UT) tablet Take 1 tablet (25 mcg) by mouth 2 times a day.      clindamycin (Cleocin) 300 mg capsule TAKE 2 CAPSULES BY MOUTH 1 HOUR PRIOR TO DENTAL PROCEDURE.      hydroCHLOROthiazide (HYDRODiuril) 12.5 mg tablet TAKE 1 TABLET BY MOUTH EVERY DAY. 90 tablet 1    lisinopril 20 mg tablet TAKE 1 TABLET BY MOUTH EVERY DAY. 90 tablet 1    metFORMIN  mg 24 hr tablet TAKE ONE TABLET BY MOUTH daily in the MORNING and then take two tablets in the evening      multivitamin tablet Take by mouth.      sildenafil (Viagra) 100 mg tablet Take by mouth.       No current facility-administered medications on file prior to encounter.          Inpatient Medications:  Scheduled medications   Medication Dose Route Frequency     PRN  medications   Medication     Continuous Medications   Medication Dose Last Rate    sodium chloride 0.9%  100 mL/hr           Review of Systems   Constitutional: Negative.    HENT: Negative.     Eyes: Negative.    Respiratory: Negative.     Cardiovascular: Negative.    Gastrointestinal: Negative.    Endocrine: Negative.    Genitourinary: Negative.    Musculoskeletal: Negative.    Skin: Negative.    Allergic/Immunologic: Negative.    Neurological: Negative.    Hematological: Negative.    Psychiatric/Behavioral: Negative.            Physical Exam  Constitutional:       General: He is awake. He is not in acute distress.     Appearance: He is not ill-appearing or toxic-appearing.   HENT:      Nose: Nose normal.      Mouth/Throat:      Mouth: Mucous membranes are moist.      Pharynx: Oropharynx is clear.   Eyes:      Extraocular Movements: Extraocular movements intact.      Conjunctiva/sclera: Conjunctivae normal.   Cardiovascular:      Rate and Rhythm: Normal rate. Rhythm irregularly irregular.      Pulses: Normal pulses.           Radial pulses are 2+ on the right side and 2+ on the left side.        Dorsalis pedis pulses are 2+ on the right side and 2+ on the left side.      Heart sounds: Normal heart sounds. No murmur heard.  Pulmonary:      Effort: Pulmonary effort is normal.      Breath sounds: Normal breath sounds and air entry.   Abdominal:      General: Bowel sounds are normal. There is no distension.      Palpations: Abdomen is soft.      Tenderness: There is no abdominal tenderness.   Musculoskeletal:      Cervical back: Normal range of motion and neck supple.      Right lower le+ Edema present.      Left lower le+ Edema present.   Skin:     General: Skin is warm and dry.   Neurological:      General: No focal deficit present.      Mental Status: He is alert and oriented to person, place, and time. Mental status is at baseline.      GCS: GCS eye subscore is 4. GCS verbal subscore is 5. GCS motor  "subscore is 6.   Psychiatric:         Mood and Affect: Mood normal.         Behavior: Behavior normal. Behavior is cooperative.         Thought Content: Thought content normal.         Judgment: Judgment normal.        Sedation Plan    ASA 3     Mallampati class: III.         Last Recorded Vitals  Blood pressure 122/63, pulse 67, temperature 36.7 °C (98.1 °F), temperature source Temporal, resp. rate 16, height 1.778 m (5' 10\"), weight 136 kg (299 lb 13.2 oz), SpO2 96 %.    Relevant Results    Labs    CBC:   Recent Labs     01/18/24  0805 08/22/23  1419 07/13/22  1210 09/15/21  1205 09/09/20  0857 05/07/19  0957   WBC 6.6 7.6 6.9 7.2 6.9 6.3   HGB 13.1* 14.8 14.1 14.2 14.7 12.8*   HCT 39.1* 44.1 42.5 41.9 44.2 40.3*    209 217 213 211 227   MCV 92 92 90 91 94 86     BMP/CMP:   Recent Labs     11/28/23  0845 10/09/23  1411 08/22/23  1419 07/13/22  1210 09/15/21  1205 04/23/21  1145 09/09/20  0857 08/13/19  1141 02/04/19  1033 04/24/18  1107 12/05/17  1134   NA  --   --  138 138 137  --  138 137 136  --  141   K  --   --  4.6 4.6 4.5 4.4 4.9 4.4 4.4   < > 4.6   CL  --   --  104 105 105  --  104 104 105  --  107   BUN  --  22 23 24* 20  --  20 24* 19  --  21   CREATININE 1.09 0.88 0.84 0.93 0.93  --  0.98 0.96 0.92  --  0.82   CO2  --   --  23 23 23  --  25 23 23  --  26   CALCIUM  --   --  9.6 9.7 8.7  --  9.6 10.1 9.5  --  9.4   PROT  --   --  7.2 7.2 7.4  --  6.8 7.0  --   --  7.0   BILITOT  --   --  0.8 0.7 0.7  --  0.5 0.5  --   --  0.5   ALKPHOS  --   --  43 37 41  --  37 46  --   --  47   ALT  --   --  27 40 38  --  31 18  --   --  22   AST  --   --  26 35 33  --  22 17  --   --  18   GLUCOSE  --   --  105* 123* 122*  --  115* 117* 116*  --  117*    < > = values in this interval not displayed.      Lipid Panel:   Recent Labs     08/22/23  1419 02/22/23  1310 07/13/22  1210 01/26/22  1240 09/15/21  1205 09/09/20  0857 05/07/19  0957 04/24/18  1107   CHOL 179  --  178  --  169 183 168 170   HDL 41.4  --  " "39.8*  --  40.9 41.9 42.0 49.0   CHHDL 4.3  --  4.5  --  4.1 4.4 4.0 3.5   VLDL 49*  --  45*  --  42* 46* 28 23   TRIG 245* 176* 224* 201* 210* 229* 141 116   NHDL 138  --  138  --  128 141  --   --      Cardiac       No lab exists for component: \"CK\", \"CKMBP\"   Hemoglobin A1C:   Recent Labs     08/22/23  1419 02/22/23  1310 07/13/22  1210 01/26/22  1240 09/15/21  1205 04/23/21  1145 09/09/20  0857 08/13/19  1141 02/04/19  1030 04/24/18  1107   HGBA1C 6.1* 6.2* 6.0* 6.3* 6.1* 5.8 5.9 5.9 5.7 5.6     TSH/ Free T4:   Recent Labs     08/22/23  1419 07/13/22  1210 09/15/21  1205 09/09/20  0857 05/07/19  0957   TSH 1.27 0.91 0.85 1.97 0.93     Iron:   Recent Labs     09/15/21  1205 02/20/18  1214   FERRITIN 207 78   TIBC 403 375   IRONSAT 27 21*     Coag:     ABO: No results found for: \"ABO\"    Past Cardiology Tests (Last 3 Years):  EKG:  Encounter Date: 12/19/23   ECG 12 lead (Clinic Performed)    Narrative    Atrial fibrillation with controlled ventricular response, low voltage     Echo:  Results for orders placed during the hospital encounter of 01/12/24    Transthoracic echo (TTE) complete    Narrative  [facility], [address] [place] [address]  Tel [phone] and Fax [phone]    TRANSTHORACIC ECHOCARDIOGRAM REPORT      Patient Name:     ALPESH Choe Physician:  [id number] Sams DO  Study Date:       1/12/2024           Ordering Provider:  [id number] SAMS  MRN/PID:          [id number]            Fellow:  Accession#:       [id number]        Nurse:              Jennifer Roper RN  Date of           1945 / 80      Sonographer:        Marci Nguyen [facility]  Birth/Age:        years  Gender:           M                   Additional Staff:   Hank yHlton RN  Height:           178.00 cm           Admit Date:         1/12/2024  Weight:           136.00 kg           Admission Status:   Outpatient  BSA:              2.48 m2             Encounter#:         " [id number]  Department          [facility]  Location:           Invasive  Blood Pressure: 156 /83 mmHg    Study Type:    TRANSTHORACIC ECHO (TTE) COMPLETE  Diagnosis/ICD: Abnormal electrocardiogram [ECG] [EKG]-R94.31  Indication:    abnormal EKG  CPT Code:      Echo Complete w Full Doppler-32454    Patient History:  Pertinent History: HTN. aortic ectasica thoracic.    Study Detail: The following Echo studies were performed: 2D, M-Mode, Doppler and  color flow. Technically challenging study due to poor acoustic  windows and body habitus. Definity used as a contrast agent for  endocardial border definition. Total contrast used for this  procedure was 2 mL via IV push.      PHYSICIAN INTERPRETATION:  Left Ventricle: The left ventricular systolic function is normal, with an estimated ejection fraction of 55-60%. The patient is in atrial fibrillation which may influence the estimate of left ventricular function and transvalvular flows. There are no regional wall motion abnormalities. The left ventricular cavity size is normal. Left ventricular diastolic filling was indeterminate.  Left Atrium: The left atrium is normal in size.  Right Ventricle: The right ventricle is mildly enlarged. There is mildly reduced right ventricular systolic function.  Right Atrium: The right atrium is mildly dilated.  Aortic Valve: The aortic valve appears structurally normal. There is trace to mild aortic valve regurgitation. The peak instantaneous gradient of the aortic valve is 7.8 mmHg. The mean gradient of the aortic valve is 3.7 mmHg.  Mitral Valve: The mitral valve is mildly thickened. There is mild mitral valve regurgitation.  Tricuspid Valve: The tricuspid valve is structurally normal. No evidence of tricuspid regurgitation.  Pulmonic Valve: The pulmonic valve is structurally normal. There is no indication of pulmonic valve regurgitation.  Pericardium: There is no pericardial effusion noted.  Aorta: The aortic root is normal.  Ascending aortal mildly enlarged at 4.0 cm.  In comparison to the previous echocardiogram(s): Compared with study from 10/11/2022, atrial fibrillation now present.      CONCLUSIONS:  1. Left ventricular systolic function is normal with a 55-60% estimated ejection fraction.  2. There is mildly reduced right ventricular systolic function.  3. Mild mitral valve regurgitation.  4. Ascending aortal mildly enlarged at 4.0 cm.  5. The patient is in atrial fibrillation which may influence the estimate of left ventricular function and transvalvular flows.    QUANTITATIVE DATA SUMMARY:  2D MEASUREMENTS:  Normal Ranges:  LAs:           4.55 cm   (2.7-4.0cm)  IVSd:          1.02 cm   (0.6-1.1cm)  LVPWd:         1.09 cm   (0.6-1.1cm)  LVIDd:         5.21 cm   (3.9-5.9cm)  LVIDs:         3.39 cm  LV Mass Index: 84.4 g/m2  LV % FS        34.9 %    LA VOLUME:  Normal Ranges:  LA Vol A4C:        65.8 ml    (22+/-6mL/m2)  LA Vol A2C:        52.1 ml  LA Vol BP:         58.5 ml  LA Vol Index A4C:  26.5ml/m2  LA Vol Index A2C:  21.0 ml/m2  LA Vol Index BP:   23.6 ml/m2  LA Area A4C:       21.9 cm2  LA Area A2C:       19.5 cm2  LA Major Axis A4C: 6.2 cm  LA Major Axis A2C: 6.2 cm  LA Volume Index:   23.4 ml/m2  LA Vol A4C:        60.0 ml  LA Vol A2C:        51.3 ml    RA VOLUME BY A/L METHOD:  Normal Ranges:  RA Area A4C: 28.1 cm2    M-MODE MEASUREMENTS:  Normal Ranges:  Ao Root: 3.80 cm (2.0-3.7cm)  LAs:     4.77 cm (2.7-4.0cm)    AORTA MEASUREMENTS:  Normal Ranges:  Ao Sinus, d: 3.30 cm (2.1-3.5cm)  Ao STJ, d:   3.50 cm (1.7-3.4cm)  Asc Ao, d:   4.00 cm (2.1-3.4cm)    LV SYSTOLIC FUNCTION BY 2D PLANIMETRY (MOD):  Normal Ranges:  EF-A4C View: 61.7 % (>=55%)  EF-A2C View: 63.6 %  EF-Biplane:  63.5 %    LV DIASTOLIC FUNCTION:  Normal Ranges:  MV e'             0.12 m/s   (>8.0)  MV lateral e'     0.12 m/s  MV medial e'      0.08 m/s  PulmV Sys Emil:    55.89 cm/s  PulmV Espinal Emil:   19.90 cm/s  PulmV S/D Emil:    2.81  PulmV A Revs Emil:  18.30 cm/s  PulmV A Revs Dur: 87.66 msec    AORTIC VALVE:  Normal Ranges:  AoV Vmax:                1.40 m/s (<=1.7m/s)  AoV Peak P.8 mmHg (<20mmHg)  AoV Mean PG:             3.7 mmHg (1.7-11.5mmHg)  LVOT Max Emil:            1.01 m/s (<=1.1m/s)  AoV VTI:                 23.08 cm (18-25cm)  LVOT VTI:                18.76 cm  LVOT Diameter:           2.00 cm  (1.8-2.4cm)  AoV Area, VTI:           2.56 cm2 (2.5-5.5cm2)  AoV Area,Vmax:           2.28 cm2 (2.5-4.5cm2)  AoV Dimensionless Index: 0.81      RIGHT VENTRICLE:  RV Basal 4.00 cm  RV Mid   3.00 cm  RV Major 7.4 cm  TAPSE:   26.0 mm    TRICUSPID VALVE/RVSP:  Normal Ranges:  Peak TR Velocity: 3.29 m/s  Est. RA Pressure: 3 mmHg  RV Syst Pressure: 46.4 mmHg (< 30mmHg)  IVC Diam:         1.70 cm    PULMONIC VALVE:  Normal Ranges:  PV Accel Time: 51 msec  (>120ms)  PV Max Emil:    1.5 m/s  (0.6-0.9m/s)  PV Max P.2 mmHg    Pulmonary Veins:  PulmV A Revs Dur: 87.66 msec  PulmV A Revs Emil: 18.30 cm/s  PulmV Espinal Emil:   19.90 cm/s  PulmV S/D Emil:    2.81  PulmV Sys Emil:    55.89 cm/s      [id number] Ozzie Nguyen DO  Electronically signed on 2024 at 4:07:53 PM        ** Final **    Ejection Fractions:  LV biplane EF   Date/Time Value Ref Range Status   2024 03:20 PM 64       Cath:  No results found for this or any previous visit.    Stress Test:  No results found for this or any previous visit.    Cardiac Imaging:  No results found for this or any previous visit.      === 23 ===    MR LIVER WO AND W CONTRAST    - Impression -  1. Mild hepatomegaly and hepatic steatosis. No suspicious liver mass.  2. Multiple simple bilateral renal cysts.  3. Cholelithiasis without evidence of acute cholecystitis.    I personally reviewed the images/study and I agree with the resident  Hank Whaley's findings as stated. This study was interpreted  at [facility], [place], [place].    MACRO:  None    Signed by: Silvano  Pennie 12/17/2023 3:36 PM  Dictation workstation:   [id number]  === 10/19/23 ===    CT ANGIO CHEST W AND WO IV CONTRAST    Addendum 10/31/2023  7:24 AM -----------------------------------------------  Interpreted By:  Josh Soto,  ADDENDUM:  CHEST:  Trachea and central airways are patent. No endobronchial lesion.  Mild peribronchial thickening and basilar atelectasis.  No suspicious lung nodules  There is no significant axillary or mediastinal lymph nodes. No hilar  adenopathy. Visualized thyroid is intact.  Esophagus is normal.    UPPER ABDOMEN:  Right upper pole renal cyst    BONE AND SOFT TISSUE:  No suspicious osseous abnormality.    Reading Radiologist: Dr. Josh Soto, Date: 10/31/2023; 7:15 am    Please refer to the below report for cardiovascular findings as  dictated by the cardiologist.    Signed by: Josh Soto 10/31/2023 7:24 AM    -------- ORIGINAL REPORT --------  Dictation workstation:   [id number]    - Impression -  1. 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 Recommend follow-up radiation  sparing cardiac MRI and thoracic MRA to assess status of the aortic  valve and size and extent of the aneurysm in approximately 24 months.  2. Isolated dilation of the right pulmonary artery. Correlate with  history of pulmonary hypertension.    Reading Cardiologist: Dr. Vianca Flores, Date: 10/20/2023; 4:46 pm    Extracardiac/extravascular findings will be reported separately by  the radiologist.    MACRO:  None    Signed by: Javi Oglesby 10/20/2023 4:57 PM  Dictation workstation:   [id number]     Assessment/Plan  Assessment/Plan   Active Problems:  There are no active Hospital Problems.        Atrial fibrillation  -DCCV with Dr. Nguyen on 1/18/24       I spent 30 minutes in the professional and overall care of this patient.      Warren Blanc, APRN-CNP, DNP

## 2024-01-18 NOTE — NURSING NOTE
Home going instructions specific to procedure given. Easily arousable; responding appropriately. Vs +/- 20% of pre procedure status. Significant complications are absent. Ambulates without dizziness/age appropriate activity, pulse ox above or equal to 92% on room air/ordered oxygen treatment. Care Plan Complete. Discharge to home accompanied by (family). Discharged via wheelchair.

## 2024-01-18 NOTE — NURSING NOTE
AVS reviewed with patient and/or family. Verbalized understanding. Copy of AVS given to patient and/or family.

## 2024-01-19 ENCOUNTER — HOSPITAL ENCOUNTER (OUTPATIENT)
Dept: CARDIOLOGY | Facility: HOSPITAL | Age: 79
Discharge: HOME | End: 2024-01-19
Payer: MEDICARE

## 2024-01-19 PROCEDURE — 93005 ELECTROCARDIOGRAM TRACING: CPT

## 2024-01-20 LAB
ATRIAL RATE: 340 BPM
ATRIAL RATE: 78 BPM
Q ONSET: 226 MS
Q ONSET: 227 MS
QRS COUNT: 12 BEATS
QRS COUNT: 12 BEATS
QRS DURATION: 74 MS
QRS DURATION: 78 MS
QT INTERVAL: 376 MS
QT INTERVAL: 392 MS
QTC CALCULATION(BAZETT): 420 MS
QTC CALCULATION(BAZETT): 423 MS
QTC FREDERICIA: 406 MS
QTC FREDERICIA: 410 MS
R AXIS: 15 DEGREES
R AXIS: 8 DEGREES
T AXIS: 45 DEGREES
T AXIS: 56 DEGREES
T OFFSET: 415 MS
T OFFSET: 422 MS
VENTRICULAR RATE: 69 BPM
VENTRICULAR RATE: 76 BPM

## 2024-02-10 DIAGNOSIS — Z96.60 HISTORY OF JOINT REPLACEMENT, UNSPECIFIED JOINT: ICD-10-CM

## 2024-02-13 LAB — BODY SURFACE AREA: 2.59 M2

## 2024-02-13 RX ORDER — CLINDAMYCIN HYDROCHLORIDE 300 MG/1
CAPSULE ORAL
Qty: 2 CAPSULE | Refills: 3 | Status: SHIPPED | OUTPATIENT
Start: 2024-02-13

## 2024-02-16 NOTE — PROGRESS NOTES
Primary Care Physician: Rochelle Pinzon MD  Date of Visit: 02/20/2024  2:00 PM EST  Location of visit: [facility] [address]     Chief Complaint:   Follow up visit     HPI / Summary:   Joseph Hayden is a 78 y.o. male Htn, gout, obesity referred regarding abnormal CT scan and concern for pulmonary hypertension.  Right pulmonary artery measuring around 3 cm on CT chest.  Not noted previously.  Denies any chest pain or shortness of breath.  No lightheadedness, dizziness, presyncope or syncope. On ECG dated 12/19/23 he was found to be in afib. Underwent elective cardioversion 1/18/24 with subsequent quick reversion back to afib based on recent monitor results.    Says he did not notice any difference post cardioversion.  Continues to be without symptoms.  Denies any chest pain, shortness of breath, lightheadedness dizziness presyncope or syncope       Monitor 1/25/24 - 2/1/24 - 99% afib/flutter burden    ECHO 1/12/24:  1. Left ventricular systolic function is normal with a 55-60% estimated ejection fraction.   2. There is mildly reduced right ventricular systolic function.   3. Mild mitral valve regurgitation.   4. Ascending aortal mildly enlarged at 4.0 cm.   5. The patient is in atrial fibrillation which may influence the estimate of left ventricular function and transvalvular flows.        CT angio chest 2023: ascending aorta 3.9 cm. Isolated dilation of right pulmonary artery  CT angio chest 2021: ascending aorta 3.9 cm.   CT chest 2019: ascending aorta 4 cm  CT chest 2015: ascending aorta 4.1 cm        Past Medical History:  Past Medical History:   Diagnosis Date    Atrial fibrillation (CMS/HCC)     Diabetes mellitus (CMS/HCC)     HTN (hypertension)     ANTONIO (obstructive sleep apnea)     Prostate cancer (CMS/HCC)         Past Surgical History:  Past Surgical History:   Procedure Laterality Date    CATARACT EXTRACTION W/  INTRAOCULAR LENS IMPLANT Bilateral     COLONOSCOPY  05/28/2013    Complete Colonoscopy    HIP  ARTHROPLASTY Right 02/19/2019          Social History:  He reports that he has quit smoking. His smoking use included cigarettes. He has never used smokeless tobacco. No history on file for alcohol use and drug use.    Family History:  family history is not on file.      Allergies:  Allergies   Allergen Reactions    Penicillins Other    Tamsulosin Unknown    Adhesive Tape-Silicones Itching and Rash     rash/itching       Outpatient Medications:  Current Outpatient Medications   Medication Instructions    allopurinol (ZYLOPRIM) 200 mg, oral, Daily    amLODIPine (NORVASC) 5 mg, oral, Daily, as directed    apixaban (ELIQUIS) 5 mg, oral, 2 times daily    cholecalciferol (Vitamin D-3) 25 MCG (1000 UT) tablet 1 tablet, oral, 2 times daily    clindamycin (Cleocin) 300 mg capsule TAKE 2 CAPSULES BY MOUTH 1 HOUR PRIOR TO DENTAL PROCEDURE.    hydroCHLOROthiazide (HYDRODIURIL) 12.5 mg, oral, Daily    lisinopril 20 mg, oral, Daily    metFORMIN  mg 24 hr tablet TAKE ONE TABLET BY MOUTH daily in the MORNING and then take two tablets in the evening    multivitamin tablet oral    sildenafil (Viagra) 100 mg tablet oral       Physical Exam:  GENERAL: alert, cooperative, pleasant, in no acute distress  SKIN: warm, dry, no rash.  NEURO: Alert and oriented x 3.  Grossly normal.  Moves all 4 extremities.    Vitals:    02/20/24 1357   BP: 146/80   BP Location: Left arm   Pulse: 85   SpO2: 96%   Weight: 134 kg (295 lb)     Wt Readings from Last 5 Encounters:   01/18/24 136 kg (299 lb 13.2 oz)   12/19/23 136 kg (300 lb)   12/07/23 134 kg (294 lb 12.1 oz)   12/12/23 132 kg (292 lb)   05/23/23 135 kg (297 lb)     Body mass index is 42.33 kg/m².        Last Labs:  CMP:  Recent Labs     01/18/24  0805 11/28/23  0845 10/09/23  1411 08/22/23  1419 07/13/22  1210     --   --  138 138   K 4.3  --   --  4.6 4.6     --   --  104 105   CO2 19*  --   --  23 23   ANIONGAP 15  --   --  16 15   BUN 24*  --  22 23 24*   CREATININE 0.95  "1.09 0.88 0.84 0.93   EGFR 81 69 87  --   --    GLUCOSE 106*  --   --  105* 123*     Recent Labs     08/22/23  1419 07/13/22  1210 09/15/21  1205   ALBUMIN 4.3 4.2 4.3   ALKPHOS 43 37 41   ALT 27 40 38   AST 26 35 33   BILITOT 0.8 0.7 0.7     CBC:  Recent Labs     01/18/24  0805 08/22/23  1419 07/13/22  1210   WBC 6.6 7.6 6.9   HGB 13.1* 14.8 14.1   HCT 39.1* 44.1 42.5    209 217   MCV 92 92 90     COAG:   Recent Labs     02/04/19  1033   INR 1.1     ENDO:  Recent Labs     08/22/23  1419 02/22/23  1310 07/13/22  1210 01/26/22  1240 09/15/21  1205 04/23/21  1145 09/09/20  0857   TSH 1.27  --  0.91  --  0.85  --  1.97   HGBA1C 6.1* 6.2* 6.0* 6.3* 6.1*   < > 5.9    < > = values in this interval not displayed.      CARDIAC: No results for input(s): \"LDH\", \"CKMB\", \"TROPHS\", \"BNP\" in the last 09275 hours.    No lab exists for component: \"CK\", \"CKMBP\"  Recent Labs     08/22/23  1419 02/22/23  1310 07/13/22  1210 01/26/22  1240 09/15/21  1205   CHOL 179  --  178  --  169   LDLF 89  --  93  --  86   HDL 41.4  --  39.8*  --  40.9   TRIG 245* 176* 224*   < > 210*    < > = values in this interval not displayed.     No data recorded        Assessment/Plan   Minimal ascending aortic enlargement. No significant concern. And stable in size the past 4 years.  Probably normal variant for him.  Isolated right pulmonary artery enlargement is nonspecific.  My suspicion is low for pulmonary hypertension as none seen on echo    Persistent atrial flutter/fib  -s/p DCC 1/2024 but quick reversion back to afib/flutter  -No attributable symptoms.    -continue eliquis as CHADsvasc >2  -Average HR in the 60s.  No need for richard blockers at this time.  We reviewed options of rate versus rhythm control.  He is not really sure if causing any symptoms as he is minimally active at the moment.  He would like to wait about 6 months and see how he does once he starts increasing activity with the warm weather and gardening activities.  Will " reassess rate versus rhythm control in about 6 months.          Followup Appts:  Future Appointments   Date Time Provider Department [place]   4/17/2024  9:00 AM Rochelle Pinzon MD [id number] [facility]   8/20/2024  2:40 PM Ozzie Nguyen DO [id number] None           ____________________________________________________________  Ozzie Nguyen DO  [facility]  [facility]

## 2024-02-20 ENCOUNTER — OFFICE VISIT (OUTPATIENT)
Dept: CARDIOLOGY | Facility: CLINIC | Age: 79
End: 2024-02-20
Payer: MEDICARE

## 2024-02-20 VITALS
DIASTOLIC BLOOD PRESSURE: 80 MMHG | BODY MASS INDEX: 42.33 KG/M2 | HEART RATE: 85 BPM | SYSTOLIC BLOOD PRESSURE: 146 MMHG | OXYGEN SATURATION: 96 % | WEIGHT: 295 LBS

## 2024-02-20 DIAGNOSIS — I48.19 PERSISTENT ATRIAL FIBRILLATION (MULTI): Primary | ICD-10-CM

## 2024-02-20 DIAGNOSIS — I10 CHRONIC HYPERTENSION: ICD-10-CM

## 2024-02-20 PROCEDURE — 1159F MED LIST DOCD IN RCRD: CPT | Performed by: INTERNAL MEDICINE

## 2024-02-20 PROCEDURE — 99213 OFFICE O/P EST LOW 20 MIN: CPT | Performed by: INTERNAL MEDICINE

## 2024-02-20 PROCEDURE — 1036F TOBACCO NON-USER: CPT | Performed by: INTERNAL MEDICINE

## 2024-02-20 PROCEDURE — 3079F DIAST BP 80-89 MM HG: CPT | Performed by: INTERNAL MEDICINE

## 2024-02-20 PROCEDURE — 3077F SYST BP >= 140 MM HG: CPT | Performed by: INTERNAL MEDICINE

## 2024-04-17 ENCOUNTER — OFFICE VISIT (OUTPATIENT)
Dept: PRIMARY CARE | Facility: CLINIC | Age: 79
End: 2024-04-17
Payer: MEDICARE

## 2024-04-17 VITALS
TEMPERATURE: 97.8 F | SYSTOLIC BLOOD PRESSURE: 114 MMHG | BODY MASS INDEX: 46.55 KG/M2 | OXYGEN SATURATION: 99 % | HEART RATE: 75 BPM | WEIGHT: 296.6 LBS | RESPIRATION RATE: 18 BRPM | HEIGHT: 67 IN | DIASTOLIC BLOOD PRESSURE: 72 MMHG

## 2024-04-17 DIAGNOSIS — R73.9 HYPERGLYCEMIA: ICD-10-CM

## 2024-04-17 DIAGNOSIS — E79.0 ASYMPTOMATIC HYPERURICEMIA: ICD-10-CM

## 2024-04-17 DIAGNOSIS — E79.0 HYPERURICEMIA: ICD-10-CM

## 2024-04-17 DIAGNOSIS — Z00.00 PHYSICAL EXAM, ANNUAL: ICD-10-CM

## 2024-04-17 DIAGNOSIS — R06.83 SNORES: ICD-10-CM

## 2024-04-17 DIAGNOSIS — Z00.00 WELLNESS EXAMINATION: Primary | ICD-10-CM

## 2024-04-17 DIAGNOSIS — I48.19 PERSISTENT ATRIAL FIBRILLATION (MULTI): ICD-10-CM

## 2024-04-17 DIAGNOSIS — I27.20 PULMONARY HYPERTENSION (MULTI): ICD-10-CM

## 2024-04-17 DIAGNOSIS — R73.9 HYPERGLYCEMIA: Primary | ICD-10-CM

## 2024-04-17 DIAGNOSIS — I77.810 AORTIC ECTASIA, THORACIC (CMS-HCC): ICD-10-CM

## 2024-04-17 DIAGNOSIS — I10 PRIMARY HYPERTENSION: ICD-10-CM

## 2024-04-17 DIAGNOSIS — Z00.00 ROUTINE GENERAL MEDICAL EXAMINATION AT HEALTH CARE FACILITY: ICD-10-CM

## 2024-04-17 DIAGNOSIS — I10 HYPERTENSION, UNSPECIFIED TYPE: ICD-10-CM

## 2024-04-17 PROBLEM — C61 PROSTATE CANCER (MULTI): Status: RESOLVED | Noted: 2023-12-07 | Resolved: 2024-04-17

## 2024-04-17 PROCEDURE — 1159F MED LIST DOCD IN RCRD: CPT | Performed by: INTERNAL MEDICINE

## 2024-04-17 PROCEDURE — 3074F SYST BP LT 130 MM HG: CPT | Performed by: INTERNAL MEDICINE

## 2024-04-17 PROCEDURE — 1036F TOBACCO NON-USER: CPT | Performed by: INTERNAL MEDICINE

## 2024-04-17 PROCEDURE — 99397 PER PM REEVAL EST PAT 65+ YR: CPT | Performed by: INTERNAL MEDICINE

## 2024-04-17 PROCEDURE — 1170F FXNL STATUS ASSESSED: CPT | Performed by: INTERNAL MEDICINE

## 2024-04-17 PROCEDURE — 1160F RVW MEDS BY RX/DR IN RCRD: CPT | Performed by: INTERNAL MEDICINE

## 2024-04-17 PROCEDURE — 3078F DIAST BP <80 MM HG: CPT | Performed by: INTERNAL MEDICINE

## 2024-04-17 PROCEDURE — G0439 PPPS, SUBSEQ VISIT: HCPCS | Performed by: INTERNAL MEDICINE

## 2024-04-17 RX ORDER — HYDROCHLOROTHIAZIDE 12.5 MG/1
12.5 TABLET ORAL DAILY
Qty: 90 TABLET | Refills: 1 | Status: SHIPPED | OUTPATIENT
Start: 2024-04-17

## 2024-04-17 RX ORDER — METFORMIN HYDROCHLORIDE 500 MG/1
TABLET, EXTENDED RELEASE ORAL
Qty: 270 TABLET | Refills: 1 | Status: SHIPPED | OUTPATIENT
Start: 2024-04-17

## 2024-04-17 RX ORDER — LISINOPRIL 20 MG/1
20 TABLET ORAL DAILY
Qty: 90 TABLET | Refills: 1 | Status: SHIPPED | OUTPATIENT
Start: 2024-04-17

## 2024-04-17 RX ORDER — AMLODIPINE BESYLATE 5 MG/1
5 TABLET ORAL DAILY
Qty: 90 TABLET | Refills: 1 | Status: SHIPPED | OUTPATIENT
Start: 2024-04-17

## 2024-04-17 RX ORDER — ALLOPURINOL 100 MG/1
200 TABLET ORAL DAILY
Qty: 180 TABLET | Refills: 1 | Status: SHIPPED | OUTPATIENT
Start: 2024-04-17

## 2024-04-17 ASSESSMENT — PATIENT HEALTH QUESTIONNAIRE - PHQ9
2. FEELING DOWN, DEPRESSED OR HOPELESS: NOT AT ALL
SUM OF ALL RESPONSES TO PHQ9 QUESTIONS 1 AND 2: 0
1. LITTLE INTEREST OR PLEASURE IN DOING THINGS: NOT AT ALL

## 2024-04-17 ASSESSMENT — ENCOUNTER SYMPTOMS
CONSTIPATION: 0
DEPRESSION: 0
AGITATION: 0
PALPITATIONS: 0
ABDOMINAL PAIN: 0
LIGHT-HEADEDNESS: 0
LOSS OF SENSATION IN FEET: 0
OCCASIONAL FEELINGS OF UNSTEADINESS: 1
HEADACHES: 0
BLOOD IN STOOL: 0

## 2024-04-17 ASSESSMENT — ACTIVITIES OF DAILY LIVING (ADL)
BATHING: INDEPENDENT
GROCERY_SHOPPING: INDEPENDENT
DRESSING: INDEPENDENT
TAKING_MEDICATION: INDEPENDENT
MANAGING_FINANCES: INDEPENDENT
DOING_HOUSEWORK: INDEPENDENT

## 2024-04-17 NOTE — PATIENT INSTRUCTIONS
Please continue working on weight reduction be active, see pulmonoary sleep medicine,return here in 4 month.

## 2024-04-17 NOTE — ASSESSMENT & PLAN NOTE
Discussed diet, exercise, immunizations, and screening appropriate for their age.  Colonoscopy: no longer

## 2024-04-17 NOTE — PROGRESS NOTES
"Subjective   Reason for Visit: Joseph Hayden is an 78 y.o. male here for a Medicare Wellness visit.          HPI    Patient Care Team:  Rochelle Pinzon MD as PCP - General (Internal Medicine)  Rochelle Pinzon MD as PCP - [employer] PCP   His diet has continue being more control, however he has been less active, just recently cleaning gardening and garage.   He has been treated for new onset atrial fibrillation, failed cardioversion in January, currently rate control, only on anticoagulation. With no excessive bleeding.  Review uric acid under allopurinol on goal , and hmga1c/glucose, discussed diet    Review of Systems   Respiratory:          Exertional dyspnea when moves fast.   Cardiovascular:  Negative for chest pain and palpitations.   Gastrointestinal:  Negative for abdominal pain, blood in stool and constipation.   Genitourinary:  Negative for urgency.        Urinary leak persist, uses depends, but uses toilet to void with good flow.   Musculoskeletal:         Intermittent knee pain that resolves with local anesthetic ( lido)  Stiffness in lower back after sitting too long   Neurological:  Negative for light-headedness and headaches.   Psychiatric/Behavioral:  Negative for agitation.         Nap after dinner , sleep interrupted by voding, but able to complete 7 to 8 hours total   All other systems reviewed and are negative.      Objective   Vitals:  /72 (BP Location: Left arm, Patient Position: Sitting, BP Cuff Size: Large adult)   Pulse 75   Temp 36.6 °C (97.8 °F)   Resp 18   Ht 1.702 m (5' 7\")   Wt 135 kg (296 lb 9.6 oz)   SpO2 99%   BMI 46.45 kg/m²       Physical Exam  Eyes - conjunctivae clear, PERRLA  HEENT - no sinus tenderness  Neck - no cervical lymphadenopathy, no thyromegaly  Axilla - no palpable lymphadenopathy  Cardiac- irregular rhythm, no murmurs, no carotid bruit, no JVP  Lung - clear to auscultation, no rales, no rhonchi, no wheezing  GI - normally active " bowel sounds, non tender, non distended, no hepatosplenomegaly, no rebound  MSK - non deformities, stiffness in lower spine  Extremities - chronic hyperpigmentation from ectasis, thick pretibial and ankle edema, good distal pulses  Neuro - non focal, oriented x 3, wide gait, using hearing aids  Skin - no bruises, no rashes  Psychiatric - pleasant, well groom, no hallucinations'  Assessment/Plan   Problem List Items Addressed This Visit       Pulmonary hypertension (Multi)    Current Assessment & Plan     Well control         Relevant Orders    Referral to Adult Sleep Medicine    Aortic ectasia, thoracic (CMS-HCC)    Current Assessment & Plan     Re eval in 2025         Primary hypertension    Current Assessment & Plan     Explain to pt need for further eval, agree w sleep medicine consult, work on reduction of BMI         Hyperglycemia    Current Assessment & Plan     Stable fasting glucose and hmba1c for several years, discussed BMI         Hyperuricemia    Current Assessment & Plan     New labs in summer, may need adjust dose         Physical exam, annual    Wellness examination - Primary    Current Assessment & Plan     Discussed diet, exercise, immunizations, and screening appropriate for their age.  Colonoscopy: no longer           Persistent atrial fibrillation (Multi)    BMI 45.0-49.9, adult (Multi)    Relevant Orders    Referral to Adult Sleep Medicine     Other Visit Diagnoses       Snores        Relevant Orders    Referral to Adult Sleep Medicine

## 2024-07-22 ENCOUNTER — LAB (OUTPATIENT)
Dept: LAB | Facility: LAB | Age: 79
End: 2024-07-22
Payer: MEDICARE

## 2024-07-22 ENCOUNTER — APPOINTMENT (OUTPATIENT)
Dept: PRIMARY CARE | Facility: CLINIC | Age: 79
End: 2024-07-22
Payer: MEDICARE

## 2024-07-22 VITALS
RESPIRATION RATE: 18 BRPM | TEMPERATURE: 97.1 F | OXYGEN SATURATION: 96 % | BODY MASS INDEX: 46.3 KG/M2 | HEIGHT: 67 IN | WEIGHT: 294.98 LBS | SYSTOLIC BLOOD PRESSURE: 118 MMHG | DIASTOLIC BLOOD PRESSURE: 74 MMHG | HEART RATE: 86 BPM

## 2024-07-22 DIAGNOSIS — E79.0 HYPERURICEMIA: ICD-10-CM

## 2024-07-22 DIAGNOSIS — I10 PRIMARY HYPERTENSION: ICD-10-CM

## 2024-07-22 DIAGNOSIS — E78.5 DYSLIPIDEMIA: ICD-10-CM

## 2024-07-22 DIAGNOSIS — R31.0 MACROSCOPIC HEMATURIA: ICD-10-CM

## 2024-07-22 DIAGNOSIS — I10 PRIMARY HYPERTENSION: Primary | ICD-10-CM

## 2024-07-22 DIAGNOSIS — E55.9 VITAMIN D DEFICIENCY, UNSPECIFIED: ICD-10-CM

## 2024-07-22 DIAGNOSIS — R73.9 HYPERGLYCEMIA: ICD-10-CM

## 2024-07-22 DIAGNOSIS — C61 PROSTATE CANCER (MULTI): ICD-10-CM

## 2024-07-22 DIAGNOSIS — Z86.39 HISTORY OF HYPERGLYCEMIA: ICD-10-CM

## 2024-07-22 LAB
25(OH)D3 SERPL-MCNC: 29 NG/ML (ref 30–100)
APPEARANCE UR: ABNORMAL
BILIRUB UR STRIP.AUTO-MCNC: NEGATIVE MG/DL
COLOR UR: ABNORMAL
ERYTHROCYTE [DISTWIDTH] IN BLOOD BY AUTOMATED COUNT: 15.7 % (ref 11.5–14.5)
EST. AVERAGE GLUCOSE BLD GHB EST-MCNC: 120 MG/DL
GLUCOSE UR STRIP.AUTO-MCNC: NORMAL MG/DL
HBA1C MFR BLD: 5.8 %
HCT VFR BLD AUTO: 40 % (ref 41–52)
HGB BLD-MCNC: 12.5 G/DL (ref 13.5–17.5)
KETONES UR STRIP.AUTO-MCNC: NEGATIVE MG/DL
LEUKOCYTE ESTERASE UR QL STRIP.AUTO: ABNORMAL
MCH RBC QN AUTO: 28.7 PG (ref 26–34)
MCHC RBC AUTO-ENTMCNC: 31.3 G/DL (ref 32–36)
MCV RBC AUTO: 92 FL (ref 80–100)
MUCOUS THREADS #/AREA URNS AUTO: ABNORMAL /LPF
NITRITE UR QL STRIP.AUTO: NEGATIVE
NRBC BLD-RTO: 0 /100 WBCS (ref 0–0)
PH UR STRIP.AUTO: 5.5 [PH]
PLATELET # BLD AUTO: 237 X10*3/UL (ref 150–450)
PROT UR STRIP.AUTO-MCNC: ABNORMAL MG/DL
RBC # BLD AUTO: 4.35 X10*6/UL (ref 4.5–5.9)
RBC # UR STRIP.AUTO: ABNORMAL /UL
RBC #/AREA URNS AUTO: >20 /HPF
SP GR UR STRIP.AUTO: 1.01
SQUAMOUS #/AREA URNS AUTO: ABNORMAL /HPF
TSH SERPL-ACNC: 0.91 MIU/L (ref 0.44–3.98)
UROBILINOGEN UR STRIP.AUTO-MCNC: NORMAL MG/DL
WBC # BLD AUTO: 7.1 X10*3/UL (ref 4.4–11.3)
WBC #/AREA URNS AUTO: ABNORMAL /HPF

## 2024-07-22 PROCEDURE — 82306 VITAMIN D 25 HYDROXY: CPT

## 2024-07-22 PROCEDURE — 85027 COMPLETE CBC AUTOMATED: CPT

## 2024-07-22 PROCEDURE — 80053 COMPREHEN METABOLIC PANEL: CPT

## 2024-07-22 PROCEDURE — 1160F RVW MEDS BY RX/DR IN RCRD: CPT | Performed by: INTERNAL MEDICINE

## 2024-07-22 PROCEDURE — 84154 ASSAY OF PSA FREE: CPT

## 2024-07-22 PROCEDURE — 80061 LIPID PANEL: CPT

## 2024-07-22 PROCEDURE — 3078F DIAST BP <80 MM HG: CPT | Performed by: INTERNAL MEDICINE

## 2024-07-22 PROCEDURE — 81001 URINALYSIS AUTO W/SCOPE: CPT

## 2024-07-22 PROCEDURE — 83036 HEMOGLOBIN GLYCOSYLATED A1C: CPT

## 2024-07-22 PROCEDURE — 87086 URINE CULTURE/COLONY COUNT: CPT

## 2024-07-22 PROCEDURE — 1036F TOBACCO NON-USER: CPT | Performed by: INTERNAL MEDICINE

## 2024-07-22 PROCEDURE — 3074F SYST BP LT 130 MM HG: CPT | Performed by: INTERNAL MEDICINE

## 2024-07-22 PROCEDURE — 36415 COLL VENOUS BLD VENIPUNCTURE: CPT

## 2024-07-22 PROCEDURE — 1159F MED LIST DOCD IN RCRD: CPT | Performed by: INTERNAL MEDICINE

## 2024-07-22 PROCEDURE — 84550 ASSAY OF BLOOD/URIC ACID: CPT

## 2024-07-22 PROCEDURE — 99214 OFFICE O/P EST MOD 30 MIN: CPT | Performed by: INTERNAL MEDICINE

## 2024-07-22 PROCEDURE — 84153 ASSAY OF PSA TOTAL: CPT

## 2024-07-22 PROCEDURE — 84443 ASSAY THYROID STIM HORMONE: CPT

## 2024-07-22 ASSESSMENT — ENCOUNTER SYMPTOMS
BRUISES/BLEEDS EASILY: 1
LOSS OF SENSATION IN FEET: 0
OCCASIONAL FEELINGS OF UNSTEADINESS: 1
HEMATURIA: 1
BLOOD IN STOOL: 0
DEPRESSION: 0

## 2024-07-22 ASSESSMENT — COLUMBIA-SUICIDE SEVERITY RATING SCALE - C-SSRS
6. HAVE YOU EVER DONE ANYTHING, STARTED TO DO ANYTHING, OR PREPARED TO DO ANYTHING TO END YOUR LIFE?: NO
1. IN THE PAST MONTH, HAVE YOU WISHED YOU WERE DEAD OR WISHED YOU COULD GO TO SLEEP AND NOT WAKE UP?: NO
2. HAVE YOU ACTUALLY HAD ANY THOUGHTS OF KILLING YOURSELF?: NO

## 2024-07-22 ASSESSMENT — PATIENT HEALTH QUESTIONNAIRE - PHQ9
1. LITTLE INTEREST OR PLEASURE IN DOING THINGS: NOT AT ALL
SUM OF ALL RESPONSES TO PHQ9 QUESTIONS 1 AND 2: 0
2. FEELING DOWN, DEPRESSED OR HOPELESS: NOT AT ALL

## 2024-07-22 NOTE — PATIENT INSTRUCTIONS
Try acetaminophen 650 mg 2 x day for joint pain, continue other medications the same, in the fall complete influenza, covid and RSV vaccine , not together.Return early december

## 2024-07-22 NOTE — PROGRESS NOTES
"Subjective   Patient ID: Joseph Hayden is a 79 y.o. male who presents for Labs Only, Hypertension, and Snoring.    HPI Diet has not changed.  He feels more tired  Arthralgias in hands when holding phone, book, uses 500 mg acetaminophen but not enough  Muscle spasm at night once a month, intense, uses topical .   Macroscopic mild hematuria since he stared anticoagulant for A fib about once weekly unchanged for about 3 m. His urinary incontinence is unchanged, denies dysuria.  Will see Sleep medicine in 1 month    Review of Systems   Gastrointestinal:  Negative for blood in stool.   Genitourinary:  Positive for hematuria and urgency.   Hematological:  Bruises/bleeds easily.       Objective   /74 (BP Location: Left arm, Patient Position: Sitting, BP Cuff Size: Adult)   Pulse 86   Temp 36.2 °C (97.1 °F)   Resp 18   Ht 1.702 m (5' 7\")   Wt 134 kg (294 lb 15.6 oz)   SpO2 96%   BMI 46.20 kg/m²     Physical Exam  Using cane for walking  Eyes- Conjunctiva clear  Neck-no thyromegaly, thick neck  Cardiac- irregular irregular, no murmurs  Lung- clear to auscultation  GI- present  bowel sounds, nontender, no rebound  MSK- no deformities  Extremities- wearing bands in bilateral legs below knee,   Neuro- non focal, oriented x 3  Psychiatric- pleasant, well groomed, no hallucinations    Assessment/Plan   Problem List Items Addressed This Visit             ICD-10-CM    Primary hypertension - Primary I10    Relevant Orders    Comprehensive Metabolic Panel    Hyperglycemia R73.9     Continue same dose of metformin         Relevant Orders    CBC    Hemoglobin A1C    TSH with reflex to Free T4 if abnormal    Hyperuricemia E79.0     Lab pending to adjust med         Relevant Orders    Uric Acid    Macroscopic hematuria R31.0     Pending labs to address with urologist         Relevant Orders    Urinalysis with Reflex Microscopic    Urine Culture     Other Visit Diagnoses         Codes    BMI 45.0-49.9, adult (Multi)     " Z68.42    Vitamin D deficiency, unspecified     E55.9    Relevant Orders    Vitamin D 25-Hydroxy,Total (for eval of Vitamin D levels)    Dyslipidemia     E78.5    Relevant Orders    Lipid Panel    History of hyperglycemia     Z86.39    Relevant Orders    TSH with reflex to Free T4 if abnormal    Prostate cancer (Multi)     C61    Relevant Orders    PSA, total and free

## 2024-07-23 LAB
ALBUMIN SERPL BCP-MCNC: 4.1 G/DL (ref 3.4–5)
ALP SERPL-CCNC: 42 U/L (ref 33–136)
ALT SERPL W P-5'-P-CCNC: 20 U/L (ref 10–52)
ANION GAP SERPL CALC-SCNC: 15 MMOL/L (ref 10–20)
AST SERPL W P-5'-P-CCNC: 21 U/L (ref 9–39)
BACTERIA UR CULT: NORMAL
BILIRUB SERPL-MCNC: 0.6 MG/DL (ref 0–1.2)
BUN SERPL-MCNC: 30 MG/DL (ref 6–23)
CALCIUM SERPL-MCNC: 9.4 MG/DL (ref 8.6–10.6)
CHLORIDE SERPL-SCNC: 106 MMOL/L (ref 98–107)
CHOLEST SERPL-MCNC: 174 MG/DL (ref 0–199)
CHOLESTEROL/HDL RATIO: 4.2
CO2 SERPL-SCNC: 20 MMOL/L (ref 21–32)
CREAT SERPL-MCNC: 0.96 MG/DL (ref 0.5–1.3)
EGFRCR SERPLBLD CKD-EPI 2021: 79 ML/MIN/1.73M*2
GLUCOSE SERPL-MCNC: 110 MG/DL (ref 74–99)
HDLC SERPL-MCNC: 41.8 MG/DL
LDLC SERPL CALC-MCNC: 95 MG/DL
NON HDL CHOLESTEROL: 132 MG/DL (ref 0–149)
POTASSIUM SERPL-SCNC: 4.4 MMOL/L (ref 3.5–5.3)
PROT SERPL-MCNC: 7.3 G/DL (ref 6.4–8.2)
SODIUM SERPL-SCNC: 137 MMOL/L (ref 136–145)
TRIGL SERPL-MCNC: 186 MG/DL (ref 0–149)
URATE SERPL-MCNC: 6.9 MG/DL (ref 4–7.5)
VLDL: 37 MG/DL (ref 0–40)

## 2024-07-24 LAB
PSA FREE MFR SERPL: <25 %
PSA FREE SERPL-MCNC: <0.1 NG/ML
PSA SERPL IA-MCNC: 0.4 NG/ML (ref 0–4)

## 2024-07-26 DIAGNOSIS — C61 PROSTATE CANCER (MULTI): ICD-10-CM

## 2024-07-26 DIAGNOSIS — R31.0 GROSS HEMATURIA: Primary | ICD-10-CM

## 2024-07-31 ENCOUNTER — TELEPHONE (OUTPATIENT)
Dept: PRIMARY CARE | Facility: CLINIC | Age: 79
End: 2024-07-31
Payer: MEDICARE

## 2024-08-20 ENCOUNTER — OFFICE VISIT (OUTPATIENT)
Dept: CARDIOLOGY | Facility: CLINIC | Age: 79
End: 2024-08-20
Payer: MEDICARE

## 2024-08-20 VITALS
SYSTOLIC BLOOD PRESSURE: 135 MMHG | BODY MASS INDEX: 45.42 KG/M2 | HEART RATE: 72 BPM | DIASTOLIC BLOOD PRESSURE: 75 MMHG | WEIGHT: 290 LBS | OXYGEN SATURATION: 96 %

## 2024-08-20 DIAGNOSIS — I48.19 PERSISTENT ATRIAL FIBRILLATION (MULTI): Primary | ICD-10-CM

## 2024-08-20 DIAGNOSIS — I27.20 PULMONARY HYPERTENSION (MULTI): ICD-10-CM

## 2024-08-20 DIAGNOSIS — I77.810 AORTIC ECTASIA, THORACIC (CMS-HCC): ICD-10-CM

## 2024-08-20 PROCEDURE — 3078F DIAST BP <80 MM HG: CPT | Performed by: INTERNAL MEDICINE

## 2024-08-20 PROCEDURE — G2211 COMPLEX E/M VISIT ADD ON: HCPCS | Performed by: INTERNAL MEDICINE

## 2024-08-20 PROCEDURE — 99214 OFFICE O/P EST MOD 30 MIN: CPT | Performed by: INTERNAL MEDICINE

## 2024-08-20 PROCEDURE — 3075F SYST BP GE 130 - 139MM HG: CPT | Performed by: INTERNAL MEDICINE

## 2024-08-20 PROCEDURE — 1159F MED LIST DOCD IN RCRD: CPT | Performed by: INTERNAL MEDICINE

## 2024-08-20 RX ORDER — ACETAMINOPHEN 500 MG
500 TABLET ORAL AS NEEDED
COMMUNITY

## 2024-08-20 NOTE — PROGRESS NOTES
NPV     HISTORY OF PRESENT ILLNESS:   Joseph Hayden is a 79 y.o. male who is being seen as a new patient today for hematuria.    PAST MEDICAL HISTORY:  Past Medical History:   Diagnosis Date    Atrial fibrillation (Multi)     Diabetes mellitus (Multi)     HTN (hypertension)     ANTONIO (obstructive sleep apnea)     Prostate cancer (Multi)    - 4+3=7 prostate cancer  - Radiation to treat prostate cancer in 2016, Dr. Radford    PAST SURGICAL HISTORY:  Past Surgical History:   Procedure Laterality Date    CATARACT EXTRACTION W/  INTRAOCULAR LENS IMPLANT Bilateral     COLONOSCOPY  05/28/2013    Complete Colonoscopy    HIP ARTHROPLASTY Right 02/19/2019        ALLERGIES:   Allergies   Allergen Reactions    Penicillins Other    Tamsulosin Unknown    Adhesive Tape-Silicones Itching and Rash     rash/itching        MEDICATIONS:   Current Outpatient Medications   Medication Instructions    acetaminophen (TYLENOL) 500 mg, oral, As needed    allopurinol (ZYLOPRIM) 200 mg, oral, Daily    amLODIPine (NORVASC) 5 mg, oral, Daily, as directed    apixaban (ELIQUIS) 5 mg, oral, 2 times daily    cholecalciferol (Vitamin D-3) 25 MCG (1000 UT) tablet 1 tablet, oral, 2 times daily    clindamycin (Cleocin) 300 mg capsule TAKE 2 CAPSULES BY MOUTH 1 HOUR PRIOR TO DENTAL PROCEDURE.    hydroCHLOROthiazide (MICROZIDE) 12.5 mg, oral, Daily    lisinopril 20 mg, oral, Daily    metFORMIN  mg 24 hr tablet TAKE ONE TABLET BY MOUTH daily in the MORNING and then take two tablets in the evening    multivitamin tablet oral    sildenafil (Viagra) 100 mg tablet oral        PHYSICAL EXAM:  There were no vitals taken for this visit.  Constitutional: Patient appears well-developed and well-nourished. No distress.    Pulmonary/Chest: Effort normal. No respiratory distress.   Abdominal: Soft, ND NT  : WNL  Musculoskeletal: Normal range of motion.    Neurological: Alert and oriented to person, place, and time.  Psychiatric: Normal mood and affect.  "Behavior is normal. Thought content normal.      Labs:  Lab Results   Component Value Date    TESTOSTERONE 346 03/08/2018     Lab Results   Component Value Date    PSA 0.4 07/22/2024     No components found for: \"CBC\"  Lab Results   Component Value Date    CREATININE 0.96 07/22/2024     No components found for: \"TESTOTMS\"  No results found for: \"TESTF\"    Imaging:    Discussion:  Doing well, no complaints. Reports he has some urinary stress incontinence and wears a pad. Pt was found to have hematuria. Recent negative UC. Will start hematuria work up and plan for CT urogram, Urine cytology and cystoscopy. Pt is in agreement with plan.     AUA 14  MARYANN 1  Assessment:      1. Gross hematuria  Referral to Urology    Measure post void residual      2. Asymptomatic microscopic hematuria  Cytology Consultation (Non-Gynecologic)    Cystoscopy      3. Prostate cancer (Multi)  Referral to Urology          Joseph Hayden is a 79 y.o. male here for NPV     Plan:   Plan for CT urogram, urine cytology and cystoscopy for completion of hematuria work up.  All questions and concerns were addressed. Patient verbalizes understanding and has no other questions at this time.      Scribe Attestation  By signing my name below, I, Emmett Harrell   attest that this documentation has been prepared under the direction and in the presence of Colt Arias MD.   "

## 2024-08-22 ENCOUNTER — OFFICE VISIT (OUTPATIENT)
Dept: UROLOGY | Facility: HOSPITAL | Age: 79
End: 2024-08-22
Payer: MEDICARE

## 2024-08-22 ENCOUNTER — LAB (OUTPATIENT)
Dept: LAB | Facility: LAB | Age: 79
End: 2024-08-22
Payer: MEDICARE

## 2024-08-22 DIAGNOSIS — R31.21 ASYMPTOMATIC MICROSCOPIC HEMATURIA: ICD-10-CM

## 2024-08-22 DIAGNOSIS — C61 PROSTATE CANCER (MULTI): ICD-10-CM

## 2024-08-22 DIAGNOSIS — R31.0 GROSS HEMATURIA: Primary | ICD-10-CM

## 2024-08-22 PROCEDURE — 51798 US URINE CAPACITY MEASURE: CPT | Performed by: UROLOGY

## 2024-08-22 PROCEDURE — 99214 OFFICE O/P EST MOD 30 MIN: CPT | Performed by: UROLOGY

## 2024-08-22 PROCEDURE — 88112 CYTOPATH CELL ENHANCE TECH: CPT

## 2024-08-23 LAB
LABORATORY COMMENT REPORT: NORMAL
LABORATORY COMMENT REPORT: NORMAL
PATH REPORT.FINAL DX SPEC: NORMAL
PATH REPORT.GROSS SPEC: NORMAL
PATH REPORT.RELEVANT HX SPEC: NORMAL
PATH REPORT.TOTAL CANCER: NORMAL

## 2024-08-27 ENCOUNTER — LAB (OUTPATIENT)
Dept: LAB | Facility: LAB | Age: 79
End: 2024-08-27
Payer: MEDICARE

## 2024-08-27 DIAGNOSIS — R31.0 GROSS HEMATURIA: ICD-10-CM

## 2024-08-27 LAB
ANION GAP SERPL CALC-SCNC: 15 MMOL/L (ref 10–20)
BUN SERPL-MCNC: 23 MG/DL (ref 6–23)
CALCIUM SERPL-MCNC: 9.2 MG/DL (ref 8.6–10.6)
CHLORIDE SERPL-SCNC: 106 MMOL/L (ref 98–107)
CO2 SERPL-SCNC: 23 MMOL/L (ref 21–32)
CREAT SERPL-MCNC: 1.06 MG/DL (ref 0.5–1.3)
EGFRCR SERPLBLD CKD-EPI 2021: 71 ML/MIN/1.73M*2
GLUCOSE SERPL-MCNC: 112 MG/DL (ref 74–99)
POTASSIUM SERPL-SCNC: 4.3 MMOL/L (ref 3.5–5.3)
SODIUM SERPL-SCNC: 140 MMOL/L (ref 136–145)

## 2024-08-27 PROCEDURE — 80048 BASIC METABOLIC PNL TOTAL CA: CPT

## 2024-08-27 PROCEDURE — 36415 COLL VENOUS BLD VENIPUNCTURE: CPT

## 2024-08-28 ENCOUNTER — APPOINTMENT (OUTPATIENT)
Dept: SLEEP MEDICINE | Facility: CLINIC | Age: 79
End: 2024-08-28
Payer: MEDICARE

## 2024-09-05 ENCOUNTER — HOSPITAL ENCOUNTER (OUTPATIENT)
Dept: RADIOLOGY | Facility: CLINIC | Age: 79
Discharge: HOME | End: 2024-09-05
Payer: MEDICARE

## 2024-09-05 DIAGNOSIS — R31.0 GROSS HEMATURIA: ICD-10-CM

## 2024-09-05 PROCEDURE — 76377 3D RENDER W/INTRP POSTPROCES: CPT

## 2024-09-05 PROCEDURE — 2550000001 HC RX 255 CONTRASTS: Performed by: UROLOGY

## 2024-09-19 ENCOUNTER — PROCEDURE VISIT (OUTPATIENT)
Dept: UROLOGY | Facility: HOSPITAL | Age: 79
End: 2024-09-19
Payer: MEDICARE

## 2024-09-19 VITALS — DIASTOLIC BLOOD PRESSURE: 74 MMHG | HEART RATE: 83 BPM | SYSTOLIC BLOOD PRESSURE: 132 MMHG

## 2024-09-19 DIAGNOSIS — Z79.2 PROPHYLACTIC ANTIBIOTIC: ICD-10-CM

## 2024-09-19 DIAGNOSIS — R31.21 ASYMPTOMATIC MICROSCOPIC HEMATURIA: Primary | ICD-10-CM

## 2024-09-19 DIAGNOSIS — N28.89 URETERAL MASS: ICD-10-CM

## 2024-09-19 LAB
POC APPEARANCE, URINE: CLEAR
POC BILIRUBIN, URINE: NEGATIVE
POC BLOOD, URINE: ABNORMAL
POC COLOR, URINE: YELLOW
POC GLUCOSE, URINE: NEGATIVE MG/DL
POC KETONES, URINE: NEGATIVE MG/DL
POC LEUKOCYTES, URINE: NEGATIVE
POC NITRITE,URINE: NEGATIVE
POC PH, URINE: 5.5 PH
POC PROTEIN, URINE: ABNORMAL MG/DL
POC SPECIFIC GRAVITY, URINE: 1.02
POC UROBILINOGEN, URINE: 0.2 EU/DL

## 2024-09-19 PROCEDURE — 52000 CYSTOURETHROSCOPY: CPT | Performed by: UROLOGY

## 2024-09-19 PROCEDURE — 81003 URINALYSIS AUTO W/O SCOPE: CPT | Mod: QW | Performed by: UROLOGY

## 2024-09-19 RX ORDER — CIPROFLOXACIN 500 MG/1
500 TABLET ORAL ONCE
Status: SHIPPED | OUTPATIENT
Start: 2024-09-19

## 2024-09-19 NOTE — PROGRESS NOTES
FUV     HISTORY OF PRESENT ILLNESS:   Joseph Hayden is a 79 y.o. male who is being seen for cystoscopy for evaluation of hematuria    PAST MEDICAL HISTORY:  Past Medical History:   Diagnosis Date    Atrial fibrillation (Multi)     Diabetes mellitus (Multi)     HTN (hypertension)     ANTONIO (obstructive sleep apnea)     Prostate cancer (Multi)    - 4+3=7 prostate cancer  - Radiation to treat prostate cancer in 2016, Dr. Radford    PAST SURGICAL HISTORY:  Past Surgical History:   Procedure Laterality Date    CATARACT EXTRACTION W/  INTRAOCULAR LENS IMPLANT Bilateral     COLONOSCOPY  05/28/2013    Complete Colonoscopy    HIP ARTHROPLASTY Right 02/19/2019        ALLERGIES:   Allergies   Allergen Reactions    Penicillins Other    Tamsulosin Unknown    Adhesive Tape-Silicones Itching and Rash     rash/itching        MEDICATIONS:   Current Outpatient Medications   Medication Instructions    acetaminophen (TYLENOL) 500 mg, oral, As needed    allopurinol (ZYLOPRIM) 200 mg, oral, Daily    amLODIPine (NORVASC) 5 mg, oral, Daily, as directed    apixaban (ELIQUIS) 5 mg, oral, 2 times daily    cholecalciferol (Vitamin D-3) 25 MCG (1000 UT) tablet 1 tablet, oral, 2 times daily    clindamycin (Cleocin) 300 mg capsule TAKE 2 CAPSULES BY MOUTH 1 HOUR PRIOR TO DENTAL PROCEDURE.    hydroCHLOROthiazide (MICROZIDE) 12.5 mg, oral, Daily    lisinopril 20 mg, oral, Daily    metFORMIN  mg 24 hr tablet TAKE ONE TABLET BY MOUTH daily in the MORNING and then take two tablets in the evening    multivitamin tablet oral    sildenafil (Viagra) 100 mg tablet oral        PHYSICAL EXAM:  Blood pressure 132/74, pulse 83.  Constitutional: Patient appears well-developed and well-nourished. No distress.    Pulmonary/Chest: Effort normal. No respiratory distress.   Abdominal: Soft, ND NT  : WNL  Musculoskeletal: Normal range of motion.    Neurological: Alert and oriented to person, place, and time.  Psychiatric: Normal mood and affect. Behavior is  "normal. Thought content normal.      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 Mild hydronephrosis. Recommend referral to Urology for further evaluation and treatment recommendations. Yellow Alert. Bilateral renal cysts.  -- Results reviewed with patient. Patient reassured.      Discussion:  Doing well, no complaints. Reports he has some urinary stress incontinence and wears a pad. The cystoscopy was completed today due to hematuria and demonstrated scar tissue and post radiation changes but no tumors, stones or lesions. Cytology sent. 1 abx given to patient in clinic today. We reviewed his imaging which was concerning for a right ureteral mass. Will refer him to Dr Newsome for right ureteroscopy and biopsy the mass. Patient is on Eliquis 5mg for A Fib.   Assessment:      1. Asymptomatic microscopic hematuria  Cystoscopy    POCT UA Automated manually resulted      2. Prophylactic antibiotic  ciprofloxacin (Cipro) tablet 500 mg            Joseph Hayden is a 79 y.o. male here for NPV     Plan:   Refer to Dr Newsome for right ureteroscopy and biopsy of the right ureteral mass  All questions and concerns were addressed. Patient verbalizes understanding and has no other questions at this time.      Scribe Attestation  By signing my name below, IShelby Scribe   attest that this documentation has been prepared under the direction and in the presence of Colt Arias MD.    "

## 2024-09-20 ENCOUNTER — TELEPHONE (OUTPATIENT)
Dept: UROLOGY | Facility: HOSPITAL | Age: 79
End: 2024-09-20
Payer: MEDICARE

## 2024-09-20 PROBLEM — N28.89 URETERAL MASS: Status: ACTIVE | Noted: 2024-09-19

## 2024-09-20 RX ORDER — CIPROFLOXACIN 2 MG/ML
400 INJECTION, SOLUTION INTRAVENOUS ONCE
OUTPATIENT
Start: 2024-09-20 | End: 2024-09-20

## 2024-09-20 RX ORDER — SODIUM CHLORIDE, SODIUM LACTATE, POTASSIUM CHLORIDE, CALCIUM CHLORIDE 600; 310; 30; 20 MG/100ML; MG/100ML; MG/100ML; MG/100ML
20 INJECTION, SOLUTION INTRAVENOUS CONTINUOUS
OUTPATIENT
Start: 2024-09-20

## 2024-09-20 RX ORDER — CHLORHEXIDINE GLUCONATE 40 MG/ML
SOLUTION TOPICAL DAILY PRN
OUTPATIENT
Start: 2024-09-20

## 2024-09-25 ENCOUNTER — LAB (OUTPATIENT)
Dept: LAB | Facility: LAB | Age: 79
End: 2024-09-25
Payer: MEDICARE

## 2024-09-25 DIAGNOSIS — N28.89 URETERAL MASS: ICD-10-CM

## 2024-09-25 LAB
ANION GAP SERPL CALC-SCNC: 14 MMOL/L (ref 10–20)
APTT PPP: 33 SECONDS (ref 27–38)
BUN SERPL-MCNC: 23 MG/DL (ref 6–23)
CALCIUM SERPL-MCNC: 8.9 MG/DL (ref 8.6–10.6)
CHLORIDE SERPL-SCNC: 108 MMOL/L (ref 98–107)
CO2 SERPL-SCNC: 24 MMOL/L (ref 21–32)
CREAT SERPL-MCNC: 1.1 MG/DL (ref 0.5–1.3)
EGFRCR SERPLBLD CKD-EPI 2021: 67 ML/MIN/1.73M*2
ERYTHROCYTE [DISTWIDTH] IN BLOOD BY AUTOMATED COUNT: 15.9 % (ref 11.5–14.5)
GLUCOSE SERPL-MCNC: 109 MG/DL (ref 74–99)
HCT VFR BLD AUTO: 38.3 % (ref 41–52)
HGB BLD-MCNC: 12.3 G/DL (ref 13.5–17.5)
INR PPP: 1.4 (ref 0.9–1.1)
MCH RBC QN AUTO: 29.4 PG (ref 26–34)
MCHC RBC AUTO-ENTMCNC: 32.1 G/DL (ref 32–36)
MCV RBC AUTO: 91 FL (ref 80–100)
NRBC BLD-RTO: 0 /100 WBCS (ref 0–0)
PLATELET # BLD AUTO: 196 X10*3/UL (ref 150–450)
POTASSIUM SERPL-SCNC: 4.6 MMOL/L (ref 3.5–5.3)
PROTHROMBIN TIME: 16.2 SECONDS (ref 9.8–12.8)
RBC # BLD AUTO: 4.19 X10*6/UL (ref 4.5–5.9)
SODIUM SERPL-SCNC: 141 MMOL/L (ref 136–145)
WBC # BLD AUTO: 6.2 X10*3/UL (ref 4.4–11.3)

## 2024-09-25 PROCEDURE — 85027 COMPLETE CBC AUTOMATED: CPT

## 2024-09-25 PROCEDURE — 80048 BASIC METABOLIC PNL TOTAL CA: CPT

## 2024-09-25 PROCEDURE — 36415 COLL VENOUS BLD VENIPUNCTURE: CPT

## 2024-09-25 PROCEDURE — 85610 PROTHROMBIN TIME: CPT

## 2024-09-25 PROCEDURE — 85730 THROMBOPLASTIN TIME PARTIAL: CPT

## 2024-10-03 ENCOUNTER — ANESTHESIA EVENT (OUTPATIENT)
Dept: OPERATING ROOM | Facility: HOSPITAL | Age: 79
End: 2024-10-03
Payer: MEDICARE

## 2024-10-03 ENCOUNTER — PRE-ADMISSION TESTING (OUTPATIENT)
Dept: PREADMISSION TESTING | Facility: HOSPITAL | Age: 79
End: 2024-10-03
Payer: MEDICARE

## 2024-10-03 VITALS
HEART RATE: 69 BPM | OXYGEN SATURATION: 97 % | BODY MASS INDEX: 43.54 KG/M2 | DIASTOLIC BLOOD PRESSURE: 70 MMHG | HEIGHT: 68 IN | WEIGHT: 287.3 LBS | SYSTOLIC BLOOD PRESSURE: 109 MMHG | TEMPERATURE: 97.6 F

## 2024-10-03 DIAGNOSIS — N28.89 URETERAL MASS: ICD-10-CM

## 2024-10-03 DIAGNOSIS — I10 PRIMARY HYPERTENSION: ICD-10-CM

## 2024-10-03 DIAGNOSIS — I48.21 PERMANENT ATRIAL FIBRILLATION (MULTI): Primary | ICD-10-CM

## 2024-10-03 LAB
ABO GROUP (TYPE) IN BLOOD: NORMAL
ANION GAP SERPL CALC-SCNC: 14 MMOL/L (ref 10–20)
ANTIBODY SCREEN: NORMAL
BUN SERPL-MCNC: 22 MG/DL (ref 6–23)
CALCIUM SERPL-MCNC: 9.3 MG/DL (ref 8.6–10.6)
CHLORIDE SERPL-SCNC: 104 MMOL/L (ref 98–107)
CO2 SERPL-SCNC: 23 MMOL/L (ref 21–32)
CREAT SERPL-MCNC: 1 MG/DL (ref 0.5–1.3)
EGFRCR SERPLBLD CKD-EPI 2021: 76 ML/MIN/1.73M*2
ERYTHROCYTE [DISTWIDTH] IN BLOOD BY AUTOMATED COUNT: 15.9 % (ref 11.5–14.5)
GLUCOSE SERPL-MCNC: 111 MG/DL (ref 74–99)
HCT VFR BLD AUTO: 38.1 % (ref 41–52)
HGB BLD-MCNC: 12.7 G/DL (ref 13.5–17.5)
MCH RBC QN AUTO: 29.4 PG (ref 26–34)
MCHC RBC AUTO-ENTMCNC: 33.3 G/DL (ref 32–36)
MCV RBC AUTO: 88 FL (ref 80–100)
NRBC BLD-RTO: 0 /100 WBCS (ref 0–0)
PLATELET # BLD AUTO: 229 X10*3/UL (ref 150–450)
POTASSIUM SERPL-SCNC: 4.3 MMOL/L (ref 3.5–5.3)
RBC # BLD AUTO: 4.32 X10*6/UL (ref 4.5–5.9)
RH FACTOR (ANTIGEN D): NORMAL
SODIUM SERPL-SCNC: 137 MMOL/L (ref 136–145)
WBC # BLD AUTO: 6.3 X10*3/UL (ref 4.4–11.3)

## 2024-10-03 PROCEDURE — 86901 BLOOD TYPING SEROLOGIC RH(D): CPT

## 2024-10-03 PROCEDURE — 36415 COLL VENOUS BLD VENIPUNCTURE: CPT

## 2024-10-03 PROCEDURE — 99205 OFFICE O/P NEW HI 60 MIN: CPT | Performed by: ANESTHESIOLOGY

## 2024-10-03 PROCEDURE — 93010 ELECTROCARDIOGRAM REPORT: CPT | Performed by: INTERNAL MEDICINE

## 2024-10-03 PROCEDURE — 93005 ELECTROCARDIOGRAM TRACING: CPT

## 2024-10-03 PROCEDURE — 80048 BASIC METABOLIC PNL TOTAL CA: CPT

## 2024-10-03 PROCEDURE — 85027 COMPLETE CBC AUTOMATED: CPT

## 2024-10-03 RX ORDER — ASPIRIN 81 MG/1
81 TABLET ORAL DAILY
COMMUNITY

## 2024-10-03 ASSESSMENT — ENCOUNTER SYMPTOMS
NECK NEGATIVE: 1
DYSPNEA AT REST: 0
BRUISES/BLEEDS EASILY: 1
GASTROINTESTINAL NEGATIVE: 1
FEVER: 0
CHILLS: 0
EYES NEGATIVE: 1
COUGH: 0
ARTHRALGIAS: 1
LIGHT-HEADEDNESS: 0
UNEXPECTED WEIGHT CHANGE: 0
NUMBNESS: 1

## 2024-10-03 ASSESSMENT — DUKE ACTIVITY SCORE INDEX (DASI)
CAN YOU DO LIGHT WORK AROUND THE HOUSE LIKE DUSTING OR WASHING DISHES: YES
CAN YOU DO MODERATE WORK AROUND THE HOUSE LIKE VACUUMING, SWEEPING FLOORS OR CARRYING GROCERIES: YES
CAN YOU WALK A BLOCK OR TWO ON LEVEL GROUND: YES
CAN YOU DO HEAVY WORK AROUND THE HOUSE LIKE SCRUBBING FLOORS OR LIFTING AND MOVING HEAVY FURNITURE: NO
CAN YOU CLIMB A FLIGHT OF STAIRS OR WALK UP A HILL: YES
CAN YOU WALK INDOORS, SUCH AS AROUND YOUR HOUSE: YES
CAN YOU RUN A SHORT DISTANCE: NO
CAN YOU PARTICIPATE IN MODERATE RECREATIONAL ACTIVITIES LIKE GOLF, BOWLING, DANCING, DOUBLES TENNIS OR THROWING A BASEBALL OR FOOTBALL: YES
CAN YOU PARTICIPATE IN STRENOUS SPORTS LIKE SWIMMING, SINGLES TENNIS, FOOTBALL, BASKETBALL, OR SKIING: NO
CAN YOU HAVE SEXUAL RELATIONS: NO
CAN YOU TAKE CARE OF YOURSELF (EAT, DRESS, BATHE, OR USE TOILET): YES
DASI METS SCORE: 6.4
CAN YOU DO YARD WORK LIKE RAKING LEAVES, WEEDING OR PUSHING A MOWER: YES
TOTAL_SCORE: 29.45

## 2024-10-03 ASSESSMENT — LIFESTYLE VARIABLES: SMOKING_STATUS: NONSMOKER

## 2024-10-03 NOTE — H&P (VIEW-ONLY)
CPM/PAT Evaluation       Name: Joseph Hayden (Joseph Hayden)  /Age: 1945/79 y.o.     Visit Type:   In-Person       Chief Complaint: ureteral mass    HPI  78 y/o male scheduled for ureteroscopy on 10/8/24 with Dr. Arias secondary to ureteral mass.  PMHX includes HTN, a fib/flutter on eliquis, DM, obesity, prostate cancer.  Presents to [facility] today for perioperative risk stratification and optimization.    Pt was experiencing asymptomatic hematuria and was seen by Dr. Arias 24. CT urogram showed ureteral mass. In office cystoscopy 24 showed scar tissue and post radiation changes without tumor, stones, lesions.    Past Medical History:   Diagnosis Date    Arrhythmia     Atrial fibrillation (Multi)     Delayed emergence from general anesthesia     Diabetes mellitus (Multi)     Easy bruising     GERD (gastroesophageal reflux disease)     HTN (hypertension)     ANTONIO (obstructive sleep apnea)     Prostate cancer (Multi)        Past Surgical History:   Procedure Laterality Date    CATARACT EXTRACTION W/  INTRAOCULAR LENS IMPLANT Bilateral     COLONOSCOPY  2013    Complete Colonoscopy    HIP ARTHROPLASTY Right 2019       Patient  reports being sexually active.    Family History   Problem Relation Name Age of Onset    Colon cancer Father      Cancer Brother         Allergies   Allergen Reactions    Penicillins Other    Tamsulosin Unknown    Adhesive Tape-Silicones Itching and Rash     rash/itching       Prior to Admission medications    Medication Sig Start Date End Date Taking? Authorizing Provider   acetaminophen (Tylenol) 500 mg tablet Take 1 tablet (500 mg) by mouth if needed for mild pain (1 - 3).    Historical Provider, MD   allopurinol (Zyloprim) 100 mg tablet Take 2 tablets (200 mg) by mouth once daily. 24   Rochelle Pinzon MD   amLODIPine (Norvasc) 5 mg tablet Take 1 tablet (5 mg) by mouth once daily. as directed 24   Rochelle Pinzon MD   apixaban (Eliquis) 5 mg  tablet Take 1 tablet (5 mg) by mouth 2 times a day. 12/19/23 12/18/24  Ozzie Nguyen DO   cholecalciferol (Vitamin D-3) 25 MCG (1000 UT) tablet Take 1 tablet (25 mcg) by mouth 2 times a day. 2/25/15   Historical Provider, MD   clindamycin (Cleocin) 300 mg capsule TAKE 2 CAPSULES BY MOUTH 1 HOUR PRIOR TO DENTAL PROCEDURE. 2/13/24   Rochelle Pinzon MD   hydroCHLOROthiazide (Microzide) 12.5 mg tablet Take 1 tablet (12.5 mg) by mouth once daily. 4/17/24   Rochelle Pinzon MD   lisinopril 20 mg tablet Take 1 tablet (20 mg) by mouth once daily. 4/17/24   Rochelle Pinzon MD   metFORMIN  mg 24 hr tablet TAKE ONE TABLET BY MOUTH daily in the MORNING and then take two tablets in the evening 4/17/24   Rochelle Pinzon MD   multivitamin tablet Take by mouth.    Historical Provider, MD   sildenafil (Viagra) 100 mg tablet Take by mouth. 8/21/18   Historical Provider, MD SMITH ROS:   Constitutional:    no fever   no chills   no unexpected weight change  Neuro/Psych:    numbness (fingers)   no light-headedness  Eyes:   neg    Ears:   neg    Nose:   neg    Mouth:   Throat:   neg    Neck:   neg    Cardio:    no chest pain   peripheral edema   no dyspnea  Respiratory:    no cough  Endocrine:   GI:   neg    :    polyuria  Musculoskeletal:    arthralgias  Hematologic:    bruises/bleeds easily (on eliquis)  Skin:  neg        Physical Exam  Vitals reviewed.   Constitutional:       General: He is not in acute distress.     Appearance: Normal appearance.   HENT:      Head: Normocephalic and atraumatic.      Right Ear: External ear normal.      Left Ear: External ear normal.      Nose: Nose normal.      Mouth/Throat:      Mouth: Mucous membranes are moist.      Pharynx: Oropharynx is clear.   Eyes:      Extraocular Movements: Extraocular movements intact.   Cardiovascular:      Rate and Rhythm: Normal rate. Rhythm irregular.      Pulses: Normal pulses.      Heart sounds: Normal heart sounds.   Pulmonary:       Effort: Pulmonary effort is normal.      Breath sounds: Normal breath sounds.   Abdominal:      General: There is no distension.   Musculoskeletal:      Cervical back: Normal range of motion. No rigidity.      Comments: B/l compression socks in place, unable to assess edema    Approx 3cm cyst on dorsal right hand   Skin:     General: Skin is warm and dry.   Neurological:      General: No focal deficit present.      Mental Status: He is alert.   Psychiatric:         Mood and Affect: Mood normal.         Behavior: Behavior normal.          PAT AIRWAY:   Airway:     Mallampati::  IV    TM distance::  >3 FB    Neck ROM::  Full      Visit Vitals  /70   Pulse 69   Temp 36.4 °C (97.6 °F)       DASI Risk Score      Flowsheet Row Pre-Admission Testing from 10/3/2024 in [facility]   Can you take care of yourself (eat, dress, bathe, or use toilet)?  2.75 filed at 10/03/2024 0845   Can you walk indoors, such as around your house? 1.75 filed at 10/03/2024 0845   Can you walk a block or two on level ground?  2.75 filed at 10/03/2024 0845   Can you climb a flight of stairs or walk up a hill? 5.5 filed at 10/03/2024 0845   Can you run a short distance? 0 filed at 10/03/2024 0845   Can you do light work around the house like dusting or washing dishes? 2.7 filed at 10/03/2024 0845   Can you do moderate work around the house like vacuuming, sweeping floors or carrying groceries? 3.5 filed at 10/03/2024 0845   Can you do heavy work around the house like scrubbing floors or lifting and moving heavy furniture?  0 filed at 10/03/2024 0845   Can you do yard work like raking leaves, weeding or pushing a mower? 4.5 filed at 10/03/2024 0845   Can you have sexual relations? 0 filed at 10/03/2024 0845   Can you participate in moderate recreational activities like golf, bowling, dancing, doubles tennis or throwing a baseball or football? 6 filed at 10/03/2024 0845   Can you participate in strenous sports like swimming,  singles tennis, football, basketball, or skiing? 0 filed at 10/03/2024 0845   DASI SCORE 29.45 filed at 10/03/2024 0845   METS Score (Will be calculated only when all the questions are answered) 6.4 filed at 10/03/2024 0845          Caprini DVT Assessment      Flowsheet Row Pre-Admission Testing from 10/3/2024 in [facility]   DVT Score 8 filed at 10/02/2024 2058   Medical Factors Present cancer, chemotherapy, or previous malignancy filed at 10/02/2024 2058   Surgical Factors Minor surgery planned filed at 10/02/2024 2058   BMI 41-50 (Morbid obesity) filed at 10/02/2024 2058          Modified Frailty Index      Flowsheet Row Pre-Admission Testing from 10/3/2024 in [facility]   Non-independent functional status (problems with dressing, bathing, personal grooming, or cooking) 0 filed at 10/03/2024 0924   History of diabetes mellitus  0.0909 filed at 10/03/2024 0924   History of COPD 0 filed at 10/03/2024 0924   History of CHF No filed at 10/03/2024 0924   History of MI 0 filed at 10/03/2024 0924   History of Percutaneous Coronary Intervention, Cardiac Surgery, or Angina No filed at 10/03/2024 0924   Hypertension requiring the use of medication  0.0909 filed at 10/03/2024 0924   Peripheral vascular disease 0 filed at 10/03/2024 0924   Impaired sensorium (cognitive impairement or loss, clouding, or delirium) 0 filed at 10/03/2024 0924   TIA or CVA withouy residual deficit 0 filed at 10/03/2024 0924   Cerebrovascular accident with deficit 0 filed at 10/03/2024 0924   Modified Frailty Index Calculator .1818 filed at 10/03/2024 0924          CHADS2 Stroke Risk  Current as of 4 minutes ago        5.9% 3 to 100%: High Risk   2 to < 3%: Medium Risk   0 to < 2%: Low Risk     Last Change:           This score determines the patient's risk of having a stroke if the patient has atrial fibrillation.          Points Metrics   0 Has Congestive Heart Failure:  No     Patients with congestive heart  failure get 1 point.    Current as of 4 minutes ago   1 Has Hypertension:  Yes     Patients with hypertension get 1 point.    Current as of 4 minutes ago   1 Age:  79     Patients who are 75 years of age or older get 1 point.    Current as of 4 minutes ago   1 Has Diabetes:  Yes      Patients with diabetes get 1 point.    Current as of 4 minutes ago   0 Had Stroke:  No  Had TIA:  No  Had Thromboembolism:  No     Patients who have had a stroke, TIA, or thromboembolism get 2 points.    Current as of 4 minutes ago             Revised Cardiac Risk Index      Flowsheet Row Pre-Admission Testing from 10/3/2024 in [facility]   High-Risk Surgery (Intraperitoneal, Intrathoracic,Suprainguinal vascular) 0 filed at 10/02/2024 2100   History of ischemic heart disease (History of MI, History of positive exercuse test, Current chest paint considered due to myocardial ischemia, Use of nitrate therapy, ECG with pathological Q Waves) 0 filed at 10/02/2024 2100   History of congestive heart failure (pulmonary edemia, bilateral rales or S3 gallop, Paroxysmal nocturnal dyspnea, CXR showing pulmonary vascular redistribution) 0 filed at 10/02/2024 2100   History of cerebrovascular disease (Prior TIA or stroke) 0 filed at 10/02/2024 2100   Pre-operative insulin treatment 0 filed at 10/02/2024 2100   Pre-operative creatinine>2 mg/dl 0 filed at 10/02/2024 2100   Revised Cardiac Risk Calculator 0 filed at 10/02/2024 2100          Apfel Simplified Score      Flowsheet Row Pre-Admission Testing from 10/3/2024 in [facility]   Smoking status 1 filed at 10/03/2024 0924   History of motion sickness or PONV  0 filed at 10/03/2024 0924   Use of postoperative opioids 1 filed at 10/03/2024 0924   Gender - Female 0=No filed at 10/03/2024 0924   Apfel Simplified Score Calculator 2 filed at 10/03/2024 0924          Risk Analysis Index Results This Encounter    No data found in the last 10 encounters.       Stop Bang Score       Flowsheet Row Pre-Admission Testing from 10/3/2024 in [facility]   Do you snore loudly? 0 filed at 10/03/2024 0845   Do you often feel tired or fatigued after your sleep? 0 filed at 10/03/2024 0845   Has anyone ever observed you stop breathing in your sleep? 0 filed at 10/03/2024 0845   Do you have or are you being treated for high blood pressure? 1 filed at 10/03/2024 0845   Recent BMI (Calculated) 46.2 filed at 10/03/2024 0845   Is BMI greater than 35 kg/m2? 1=Yes filed at 10/03/2024 0845   Age older than 50 years old? 1=Yes filed at 10/03/2024 0845   Is your neck circumference greater than 17 inches (Male) or 16 inches (Female)? 0 filed at 10/03/2024 0845   Gender - Male 1=Yes filed at 10/03/2024 0845   STOP-BANG Total Score 4 filed at 10/03/2024 0845            No results found for this or any previous visit (from the past 24 hour(s)).     Assessment and Plan:    80 y/o male scheduled for ureteroscopy on 10/8/24 with Dr. Arias secondary to ureteral mass.  PMHX includes HTN, a fib on eliquis, DM, obesity, prostate cancer.  Presents to CPM today for perioperative risk stratification and optimization.    Neuro:  No neurologic diagnosis, however, the patient is at increased risk for perioperative delirium secondary to age  Patient is at increased risk for perioperative CVA secondary to Afib, perioperative interruption of anticoagulant, HTN, DM, increased age, hypercoagulable state    HEENT:  No HEENT diagnosis or significant findings on chart review or clinical presentation and evaluation. No further preoperative testing/intervention indicated at this time.    Cardiovascular:  Hypertension  Well controlled on amlodipine, hydrochlorothiazide, lisinopril     Atrial Fibrillation/Flutter  On eliquis. He is s/p cardioversion 1/18/24 but converted back to atrial fibrillation. Remains asymptomatic. CHADS2 3. Per pt, discussed holding eliquis for surgery with cardiologist. Instructed to take 81mg ASA  "while off eliquis.     ?Pulmonary HTN  Diagnosis was made due to enlarged PA on CT angio 10/19/23. According to cardiologist note, enlargement is nonspecific and suspicion for this diagnosis is low.     Thoracic Aortic Dilation  Stable, plan is to follow annually.     He was last seen by his cardiologist, Dr. Nguyen, 8/20/24. He was evaluated for a fib/flutter at that time. Decision was made to continue eliquis for stroke prevention, but no need for rate or rhythm control as patient asymptomatic and average HR in 60s. According to his note:    \"Holter Monitor 1/25/24 - 2/1/24 - 99% afib/flutter burden     CT angio chest 2023: ascending aorta 3.9 cm. Isolated dilation of right pulmonary artery  CT angio chest 2021: ascending aorta 3.9 cm.   CT chest 2019: ascending aorta 4 cm  CT chest 2015: ascending aorta 4.1 cm\"    Transthoracic Echo (TTE) Complete With Contrast 1/12/24:  1. Left ventricular systolic function is normal with a 55-60% estimated ejection fraction.   2. There is mildly reduced right ventricular systolic function.   3. Mild mitral valve regurgitation.   4. Ascending aortal mildly enlarged at 4.0 cm.   5. The patient is in atrial fibrillation which may influence the estimate of left ventricular function and transvalvular flows.    No further preoperative testing or intervention is indicated at this time.    METS: 6.4  RCRI: 0 points, 3.9%  risk for postoperative MACE   JORJE: 0.3% risk for 30 day postoperative MACE  EKG 1/2024 rate controlled a fib -> NSR post cardioversion.  EKG repeated today 10/3/24 - a flutter, rate controlled    Pulmonary:  ANTONIO  Pt not on CPAP. Referred to sleep medicine however did not attend appointment    Stop Bang score is 4 placing patient at high risk for ANTONIO  ARISCAT: <26 points, 1.6% risk of in-hospital postoperative pulmonary complication  PRODIGY: High risk for opioid induced respiratory depression    Pulmonary toilet education discussed, patient also provided deep " breathing exercises and incentive spirometry educational handout    Renal:   Ureteral Mass  Scheduled to undergo ureteroscopy on 10/8/24 with Dr. Arias    Prostate Cancer  S/p radiation in 2016    Endocrine:  Type II DM  Well controlled on metformin. A1c 5.8% 5/2023.    No further testing or intervention is indicated at this time.    Hematologic:  No hematologic diagnosis, however patient is at an increased risk for DVT  Caprini Score 8, patient at Moderate risk for perioperative DVT.  Patient provided with VTE education/handout.    Gastrointestinal:   GERD  Mild intermittent symptoms managed on OTC tums.     Eat-10 score 0  Apfel 2    Infectious disease:   No infectious diagnosis or significant findings on chart review or clinical presentation and evaluation.     Musculoskeletal:   Gout  Daily allopurinol    Arthritis  Tylenol prn    Anesthesia/Airway:  No anesthesia complications though reports slow emergence      Medication instructions and NPO guidelines reviewed with the patient.  All questions or concerns discussed and addressed.      Patient seen and staffed with Dr. De Leon

## 2024-10-03 NOTE — CPM/PAT H&P
CPM/PAT Evaluation       Name: Joseph Hayden (Joseph Hayden)  /Age: 1945/79 y.o.     Visit Type:   In-Person       Chief Complaint: ureteral mass    HPI  78 y/o male scheduled for ureteroscopy on 10/8/24 with Dr. Arias secondary to ureteral mass.  PMHX includes HTN, a fib/flutter on eliquis, DM, obesity, prostate cancer.  Presents to [facility] today for perioperative risk stratification and optimization.    Pt was experiencing asymptomatic hematuria and was seen by Dr. Arias 24. CT urogram showed ureteral mass. In office cystoscopy 24 showed scar tissue and post radiation changes without tumor, stones, lesions.    Past Medical History:   Diagnosis Date    Arrhythmia     Atrial fibrillation (Multi)     Delayed emergence from general anesthesia     Diabetes mellitus (Multi)     Easy bruising     GERD (gastroesophageal reflux disease)     HTN (hypertension)     ANTONIO (obstructive sleep apnea)     Prostate cancer (Multi)        Past Surgical History:   Procedure Laterality Date    CATARACT EXTRACTION W/  INTRAOCULAR LENS IMPLANT Bilateral     COLONOSCOPY  2013    Complete Colonoscopy    HIP ARTHROPLASTY Right 2019       Patient  reports being sexually active.    Family History   Problem Relation Name Age of Onset    Colon cancer Father      Cancer Brother         Allergies   Allergen Reactions    Penicillins Other    Tamsulosin Unknown    Adhesive Tape-Silicones Itching and Rash     rash/itching       Prior to Admission medications    Medication Sig Start Date End Date Taking? Authorizing Provider   acetaminophen (Tylenol) 500 mg tablet Take 1 tablet (500 mg) by mouth if needed for mild pain (1 - 3).    Historical Provider, MD   allopurinol (Zyloprim) 100 mg tablet Take 2 tablets (200 mg) by mouth once daily. 24   Rochelle Pinzon MD   amLODIPine (Norvasc) 5 mg tablet Take 1 tablet (5 mg) by mouth once daily. as directed 24   Rochelle Pinzon MD   apixaban (Eliquis) 5 mg  tablet Take 1 tablet (5 mg) by mouth 2 times a day. 12/19/23 12/18/24  Ozzie Nguyen DO   cholecalciferol (Vitamin D-3) 25 MCG (1000 UT) tablet Take 1 tablet (25 mcg) by mouth 2 times a day. 2/25/15   Historical Provider, MD   clindamycin (Cleocin) 300 mg capsule TAKE 2 CAPSULES BY MOUTH 1 HOUR PRIOR TO DENTAL PROCEDURE. 2/13/24   Rochelle Pinzon MD   hydroCHLOROthiazide (Microzide) 12.5 mg tablet Take 1 tablet (12.5 mg) by mouth once daily. 4/17/24   Rochelle Pinzon MD   lisinopril 20 mg tablet Take 1 tablet (20 mg) by mouth once daily. 4/17/24   Rochelle Pinzon MD   metFORMIN  mg 24 hr tablet TAKE ONE TABLET BY MOUTH daily in the MORNING and then take two tablets in the evening 4/17/24   Rochelle Pinzon MD   multivitamin tablet Take by mouth.    Historical Provider, MD   sildenafil (Viagra) 100 mg tablet Take by mouth. 8/21/18   Historical Provider, MD SMITH ROS:   Constitutional:    no fever   no chills   no unexpected weight change  Neuro/Psych:    numbness (fingers)   no light-headedness  Eyes:   neg    Ears:   neg    Nose:   neg    Mouth:   Throat:   neg    Neck:   neg    Cardio:    no chest pain   peripheral edema   no dyspnea  Respiratory:    no cough  Endocrine:   GI:   neg    :    polyuria  Musculoskeletal:    arthralgias  Hematologic:    bruises/bleeds easily (on eliquis)  Skin:  neg        Physical Exam  Vitals reviewed.   Constitutional:       General: He is not in acute distress.     Appearance: Normal appearance.   HENT:      Head: Normocephalic and atraumatic.      Right Ear: External ear normal.      Left Ear: External ear normal.      Nose: Nose normal.      Mouth/Throat:      Mouth: Mucous membranes are moist.      Pharynx: Oropharynx is clear.   Eyes:      Extraocular Movements: Extraocular movements intact.   Cardiovascular:      Rate and Rhythm: Normal rate. Rhythm irregular.      Pulses: Normal pulses.      Heart sounds: Normal heart sounds.   Pulmonary:       Effort: Pulmonary effort is normal.      Breath sounds: Normal breath sounds.   Abdominal:      General: There is no distension.   Musculoskeletal:      Cervical back: Normal range of motion. No rigidity.      Comments: B/l compression socks in place, unable to assess edema    Approx 3cm cyst on dorsal right hand   Skin:     General: Skin is warm and dry.   Neurological:      General: No focal deficit present.      Mental Status: He is alert.   Psychiatric:         Mood and Affect: Mood normal.         Behavior: Behavior normal.          PAT AIRWAY:   Airway:     Mallampati::  IV    TM distance::  >3 FB    Neck ROM::  Full      Visit Vitals  /70   Pulse 69   Temp 36.4 °C (97.6 °F)       DASI Risk Score      Flowsheet Row Pre-Admission Testing from 10/3/2024 in [facility]   Can you take care of yourself (eat, dress, bathe, or use toilet)?  2.75 filed at 10/03/2024 0845   Can you walk indoors, such as around your house? 1.75 filed at 10/03/2024 0845   Can you walk a block or two on level ground?  2.75 filed at 10/03/2024 0845   Can you climb a flight of stairs or walk up a hill? 5.5 filed at 10/03/2024 0845   Can you run a short distance? 0 filed at 10/03/2024 0845   Can you do light work around the house like dusting or washing dishes? 2.7 filed at 10/03/2024 0845   Can you do moderate work around the house like vacuuming, sweeping floors or carrying groceries? 3.5 filed at 10/03/2024 0845   Can you do heavy work around the house like scrubbing floors or lifting and moving heavy furniture?  0 filed at 10/03/2024 0845   Can you do yard work like raking leaves, weeding or pushing a mower? 4.5 filed at 10/03/2024 0845   Can you have sexual relations? 0 filed at 10/03/2024 0845   Can you participate in moderate recreational activities like golf, bowling, dancing, doubles tennis or throwing a baseball or football? 6 filed at 10/03/2024 0845   Can you participate in strenous sports like swimming,  singles tennis, football, basketball, or skiing? 0 filed at 10/03/2024 0845   DASI SCORE 29.45 filed at 10/03/2024 0845   METS Score (Will be calculated only when all the questions are answered) 6.4 filed at 10/03/2024 0845          Caprini DVT Assessment      Flowsheet Row Pre-Admission Testing from 10/3/2024 in [facility]   DVT Score 8 filed at 10/02/2024 2058   Medical Factors Present cancer, chemotherapy, or previous malignancy filed at 10/02/2024 2058   Surgical Factors Minor surgery planned filed at 10/02/2024 2058   BMI 41-50 (Morbid obesity) filed at 10/02/2024 2058          Modified Frailty Index      Flowsheet Row Pre-Admission Testing from 10/3/2024 in [facility]   Non-independent functional status (problems with dressing, bathing, personal grooming, or cooking) 0 filed at 10/03/2024 0924   History of diabetes mellitus  0.0909 filed at 10/03/2024 0924   History of COPD 0 filed at 10/03/2024 0924   History of CHF No filed at 10/03/2024 0924   History of MI 0 filed at 10/03/2024 0924   History of Percutaneous Coronary Intervention, Cardiac Surgery, or Angina No filed at 10/03/2024 0924   Hypertension requiring the use of medication  0.0909 filed at 10/03/2024 0924   Peripheral vascular disease 0 filed at 10/03/2024 0924   Impaired sensorium (cognitive impairement or loss, clouding, or delirium) 0 filed at 10/03/2024 0924   TIA or CVA withouy residual deficit 0 filed at 10/03/2024 0924   Cerebrovascular accident with deficit 0 filed at 10/03/2024 0924   Modified Frailty Index Calculator .1818 filed at 10/03/2024 0924          CHADS2 Stroke Risk  Current as of 4 minutes ago        5.9% 3 to 100%: High Risk   2 to < 3%: Medium Risk   0 to < 2%: Low Risk     Last Change:           This score determines the patient's risk of having a stroke if the patient has atrial fibrillation.          Points Metrics   0 Has Congestive Heart Failure:  No     Patients with congestive heart  failure get 1 point.    Current as of 4 minutes ago   1 Has Hypertension:  Yes     Patients with hypertension get 1 point.    Current as of 4 minutes ago   1 Age:  79     Patients who are 75 years of age or older get 1 point.    Current as of 4 minutes ago   1 Has Diabetes:  Yes      Patients with diabetes get 1 point.    Current as of 4 minutes ago   0 Had Stroke:  No  Had TIA:  No  Had Thromboembolism:  No     Patients who have had a stroke, TIA, or thromboembolism get 2 points.    Current as of 4 minutes ago             Revised Cardiac Risk Index      Flowsheet Row Pre-Admission Testing from 10/3/2024 in [facility]   High-Risk Surgery (Intraperitoneal, Intrathoracic,Suprainguinal vascular) 0 filed at 10/02/2024 2100   History of ischemic heart disease (History of MI, History of positive exercuse test, Current chest paint considered due to myocardial ischemia, Use of nitrate therapy, ECG with pathological Q Waves) 0 filed at 10/02/2024 2100   History of congestive heart failure (pulmonary edemia, bilateral rales or S3 gallop, Paroxysmal nocturnal dyspnea, CXR showing pulmonary vascular redistribution) 0 filed at 10/02/2024 2100   History of cerebrovascular disease (Prior TIA or stroke) 0 filed at 10/02/2024 2100   Pre-operative insulin treatment 0 filed at 10/02/2024 2100   Pre-operative creatinine>2 mg/dl 0 filed at 10/02/2024 2100   Revised Cardiac Risk Calculator 0 filed at 10/02/2024 2100          Apfel Simplified Score      Flowsheet Row Pre-Admission Testing from 10/3/2024 in [facility]   Smoking status 1 filed at 10/03/2024 0924   History of motion sickness or PONV  0 filed at 10/03/2024 0924   Use of postoperative opioids 1 filed at 10/03/2024 0924   Gender - Female 0=No filed at 10/03/2024 0924   Apfel Simplified Score Calculator 2 filed at 10/03/2024 0924          Risk Analysis Index Results This Encounter    No data found in the last 10 encounters.       Stop Bang Score       Flowsheet Row Pre-Admission Testing from 10/3/2024 in [facility]   Do you snore loudly? 0 filed at 10/03/2024 0845   Do you often feel tired or fatigued after your sleep? 0 filed at 10/03/2024 0845   Has anyone ever observed you stop breathing in your sleep? 0 filed at 10/03/2024 0845   Do you have or are you being treated for high blood pressure? 1 filed at 10/03/2024 0845   Recent BMI (Calculated) 46.2 filed at 10/03/2024 0845   Is BMI greater than 35 kg/m2? 1=Yes filed at 10/03/2024 0845   Age older than 50 years old? 1=Yes filed at 10/03/2024 0845   Is your neck circumference greater than 17 inches (Male) or 16 inches (Female)? 0 filed at 10/03/2024 0845   Gender - Male 1=Yes filed at 10/03/2024 0845   STOP-BANG Total Score 4 filed at 10/03/2024 0845            No results found for this or any previous visit (from the past 24 hour(s)).     Assessment and Plan:    80 y/o male scheduled for ureteroscopy on 10/8/24 with Dr. Arias secondary to ureteral mass.  PMHX includes HTN, a fib on eliquis, DM, obesity, prostate cancer.  Presents to CPM today for perioperative risk stratification and optimization.    Neuro:  No neurologic diagnosis, however, the patient is at increased risk for perioperative delirium secondary to age  Patient is at increased risk for perioperative CVA secondary to Afib, perioperative interruption of anticoagulant, HTN, DM, increased age, hypercoagulable state    HEENT:  No HEENT diagnosis or significant findings on chart review or clinical presentation and evaluation. No further preoperative testing/intervention indicated at this time.    Cardiovascular:  Hypertension  Well controlled on amlodipine, hydrochlorothiazide, lisinopril     Atrial Fibrillation/Flutter  On eliquis. He is s/p cardioversion 1/18/24 but converted back to atrial fibrillation. Remains asymptomatic. CHADS2 3. Per pt, discussed holding eliquis for surgery with cardiologist. Instructed to take 81mg ASA  "while off eliquis.     ?Pulmonary HTN  Diagnosis was made due to enlarged PA on CT angio 10/19/23. According to cardiologist note, enlargement is nonspecific and suspicion for this diagnosis is low.     Thoracic Aortic Dilation  Stable, plan is to follow annually.     He was last seen by his cardiologist, Dr. Nguyen, 8/20/24. He was evaluated for a fib/flutter at that time. Decision was made to continue eliquis for stroke prevention, but no need for rate or rhythm control as patient asymptomatic and average HR in 60s. According to his note:    \"Holter Monitor 1/25/24 - 2/1/24 - 99% afib/flutter burden     CT angio chest 2023: ascending aorta 3.9 cm. Isolated dilation of right pulmonary artery  CT angio chest 2021: ascending aorta 3.9 cm.   CT chest 2019: ascending aorta 4 cm  CT chest 2015: ascending aorta 4.1 cm\"    Transthoracic Echo (TTE) Complete With Contrast 1/12/24:  1. Left ventricular systolic function is normal with a 55-60% estimated ejection fraction.   2. There is mildly reduced right ventricular systolic function.   3. Mild mitral valve regurgitation.   4. Ascending aortal mildly enlarged at 4.0 cm.   5. The patient is in atrial fibrillation which may influence the estimate of left ventricular function and transvalvular flows.    No further preoperative testing or intervention is indicated at this time.    METS: 6.4  RCRI: 0 points, 3.9%  risk for postoperative MACE   JORJE: 0.3% risk for 30 day postoperative MACE  EKG 1/2024 rate controlled a fib -> NSR post cardioversion.  EKG repeated today 10/3/24 - a flutter, rate controlled    Pulmonary:  ANTONIO  Pt not on CPAP. Referred to sleep medicine however did not attend appointment    Stop Bang score is 4 placing patient at high risk for ANTONIO  ARISCAT: <26 points, 1.6% risk of in-hospital postoperative pulmonary complication  PRODIGY: High risk for opioid induced respiratory depression    Pulmonary toilet education discussed, patient also provided deep " breathing exercises and incentive spirometry educational handout    Renal:   Ureteral Mass  Scheduled to undergo ureteroscopy on 10/8/24 with Dr. Arias    Prostate Cancer  S/p radiation in 2016    Endocrine:  Type II DM  Well controlled on metformin. A1c 5.8% 5/2023.    No further testing or intervention is indicated at this time.    Hematologic:  No hematologic diagnosis, however patient is at an increased risk for DVT  Caprini Score 8, patient at Moderate risk for perioperative DVT.  Patient provided with VTE education/handout.    Gastrointestinal:   GERD  Mild intermittent symptoms managed on OTC tums.     Eat-10 score 0  Apfel 2    Infectious disease:   No infectious diagnosis or significant findings on chart review or clinical presentation and evaluation.     Musculoskeletal:   Gout  Daily allopurinol    Arthritis  Tylenol prn    Anesthesia/Airway:  No anesthesia complications though reports slow emergence      Medication instructions and NPO guidelines reviewed with the patient.  All questions or concerns discussed and addressed.      Patient seen and staffed with Dr. De Leon

## 2024-10-03 NOTE — PREPROCEDURE INSTRUCTIONS
Thank you for visiting [facility] ([facility]) today for your pre-procedure evaluation, you were seen by Dr. José Miguel Kelley ([phone])    This summary includes instructions and information to aid you during your perioperative period.  Please read carefully. If you have any questions about your visit today, please call the number listed above.  If you become ill or have any changes to your health before your surgery, please contact your primary care provider and alert your surgeon.    Preparing for your Surgery       Exercises  Preoperative Deep Breathing Exercises  Why it is important to do deep breathing exercises before my surgery?  Deep breathing exercises strengthen your breathing muscles.  This helps you to recover after your surgery and decreases the chance of breathing complications.  How are the deep breathing exercises done?  Sit straight with your back supported.  Breathe in deeply and slowly through your nose. Your lower rib cage should expand and your abdomen may move forward.  Hold that breath for 3 to 5 seconds.  Breathe out through pursed lips, slowly and completely.  Rest and repeat 10 times every hour while awake.  Rest longer if you become dizzy or lightheaded.       Incentive Spirometer   You were not provided an incentive spirometer in [facility], please disregard the incentive spirometer instructions  What is an incentive spirometer?  An incentive spirometer is a device used before and after surgery to “exercise” your lungs.  It helps you to take deeper breaths to expand your lungs.  Below is an example of a basic incentive spirometer.  The device you receive may differ slightly but they all function the same.    Why do I need to use an incentive spirometer?  Using your incentive spirometer prepares your lungs for surgery and helps prevent lung problems after surgery.  How do I use my incentive spirometer?  When you're using your incentive spirometer, make sure to breathe  through your mouth. If you breathe through your nose, the incentive spirometer won't work properly. You can hold your nose if you have trouble.  If you feel dizzy at any time, stop and rest. Try again at a later time.  Follow the steps below:  Set up your incentive spirometer, expand the flexible tubing and connect to the outlet.  Sit upright in a chair or bed. Hold the incentive spirometer at eye level.   Put the mouthpiece in your mouth and close your lips tightly around it. Slowly breathe out (exhale) completely.  Breathe in (inhale) slowly through your mouth as deeply as you can. As you take a breath, you will see the piston rise inside the large column. While the piston rises, the indicator should move upwards. It should stay in between the 2 arrows (see Figure).  Try to get the piston as high as you can, while keeping the indicator between the arrows.   If the indicator doesn't stay between the arrows, you're breathing either too fast or too slow.  When you get it as high as you can, hold your breath for 10 seconds, or as long as possible. While you're holding your breath, the piston will slowly fall to the base of the spirometer.  Once the piston reaches the bottom of the spirometer, breathe out slowly through your mouth. Rest for a few seconds.  Repeat 10 times. Try to get the piston to the same level with each breath.  Repeat every hour while awake  You can carefully clean the outside of the mouthpiece with an alcohol wipe or soap and water.      Preoperative Brain Exercises    What are brain exercises?  A brain exercise is any activity that engages your thinking (cognitive) skills.    What types of activities are considered brain exercises?  Jigsaw puzzles, crossword puzzles, word jumble, memory games, word search, and many more.  Many can be found free online or on your phone via a mobile antonia.    Why should I do brain exercises before my surgery?  More recent research has shown brain exercise before  surgery can lower the risk of postoperative delirium (confusion) which can be especially important for older adults.  Patients who did brain exercises for 5 to 10 hours the days before surgery, cut their risk of postoperative delirium in half up to 1 week after surgery.    Sit-to-Stand Exercise    What is the sit-to-stand exercise?  The sit-to-stand exercise strengthens the muscles of your lower body and muscles in the center of your body (core muscles for stability) helping to maintain and improve your strength and mobility.  How do I do the sit-to-stand exercise?  The goal is to do this exercise without using your arms or hands.  If this is too difficult, use your arms and hands or a chair with armrests to help slowly push yourself to the standing position and lower yourself back to the sitting position. As the movement becomes easier use your arms and hands less.    Steps to the sit-to-stand exercise  Sit up tall in a sturdy chair, knees bent, feet flat on the floor shoulder-width apart.  Shift your hips/pelvis forward in the chair to correctly position yourself for the next movement.  Lean forward at your hips.  Stand up straight putting equal weight on both feet.  Check to be sure you are properly aligned with the chair, in a slow controlled movement sit back down.  Repeat this exercise 10-15 times.  If needed you can do it fewer times until your strength improves.  Rest for 1 minute.  Do another 10-15 sit-to-stand exercises.  Try to do this in the morning and evening.        Instructions    Preoperative Fasting Guidelines    Why must I stop eating and drinking near surgery time?  With sedation, food or liquid in your stomach can enter your lungs causing serious complications  Food can increase nausea and vomiting  When do I need to stop eating and drinking before my surgery?      Do not eat any food after midnight the night before your surgery/procedure. You may have up to 13.5 ounces of clear liquid until TWO  hours before your instructed arrival time to the hospital.  This includes water, black tea/coffee, (no milk or cream) apple juice, and electrolyte drinks (Gatorade). You may chew gum until TWO hours before your surgery/procedure            Simple things you can do to help prevent blood clots     Blood clots are blockages that can form in the body's veins. When a blood clot forms in your deep veins, it may be called a deep vein thrombosis, or DVT for short. Blood clots can happen in any part of the body where blood flows, but they are most common in the arms and legs. If a piece of a blood clot breaks free and travels to the lungs, it is called a pulmonary embolus (PE). A PE can be a very serious problem.         Being in the hospital or having surgery can raise your chances of getting a blood clot because you may not be well enough to move around as much as you normally do.         Ways you can help prevent blood clots in the hospital       Wearing SCDs  SCDs stands for Sequential Compression Devices.   SCDs are special sleeves that wrap around your legs. They attach to a pump that fills them with air to gently squeeze your legs every few minutes.  This helps return the blood in your legs to your heart.   SCDs should only be taken off when walking or bathing. SCDs may not be comfortable, but they can help save your life.              Pump SCD leg sleeves  Wearing compression stockings - if your doctor orders them. These special snug-fitting stockings gently squeeze your legs to help blood flow.       Walking. Walking helps move the blood in your legs.   If your doctor says it is ok, try walking the halls at least   5 times a day. Ask us to help you get up, so you don't fall.      Taking any blood-thinning medicines your doctor orders.              Ways you can help prevent blood clots at home         Wearing compression stockings - if your doctor orders them.   Walking - to help move the blood in your legs.     Taking any blood-thinning medicines your doctor orders.      Signs of a blood clot or PE    Tell your doctor or nurse right away if you have any of the problems listed below.         If you are at home, seek medical care right away. Call 911 for chest pain or problems breathing.            Signs of a blood clot (DVT) - such as pain, swelling, redness, or warmth in your arm or legs.  Signs of a pulmonary embolism (PE) - such as chest pain or feeling short of breath      Tobacco and Alcohol;  Do not drink alcohol or smoke within 24 hours of surgery.  It is best to quit smoking for as long as possible before any surgery or procedure.      The Week before Surgery        Seven days before Surgery  Check your [facility] medication instructions  Do the exercises provided to you by [facility]   Arrange for a responsible, adult licensed  to take you home after surgery and stay with you for 24 hours.  You will not be permitted to drive yourself home if you have received any anesthetic/sedation  Six days before surgery  Check your [facility] medication instructions  Do the exercises provided to you by [facility]   Start using Chlorhexidene (CHG) body wash if prescribed  Five days before surgery  Check your [facility] medication instructions  Do the exercises provided to you by [facility]   Continue to use CHG body wash if prescribed  Three days before surgery  Check your [facility] medication instructions  Do the exercises provided to you by [facility]   Continue to use CHG body wash if prescribed  Two days before surgery  Check your [facility] medication instructions  Do the exercises provided to you by [facility]   Continue to use CHG body wash if prescribed    The Day before Surgery       Check your [facility] medication and all other [facility] instructions including when to stop eating and drinking  You will be called with your arrival time for surgery in the late afternoon.  If you do not receive a call please reach out to your surgeon's office.  Do not smoke or drink 24 hours before  surgery  Prepare items to bring with you to the hospital  Shower with your chlorhexidine wash if prescribed  Brush your teeth and use your chlorhexidine dental rinse if prescribed    The Day of Surgery       Check your [facility] medication instructions  Ensure you follow the instructions for when to stop eating and drinking  Shower, if prescribed use CHG.  Do not apply any lotions, creams, moisturizers, perfume or deodorant  Brush your teeth and use your CHG dental rinse if prescribed  Wear loose comfortable clothing  Avoid make-up  Remove  jewelry and piercings, consider professional piercing removal with a plastic spacer if needed  Bring photo ID and Insurance card  Bring an accurate medication list that includes medication dose, frequency and allergies  Bring a copy of your advanced directives (will, health care power of )  Bring any devices and controllers as well as medical devices you have been provided with for surgery (CPAP, slings, braces, etc.)  Dentures, eyeglasses, and contacts will be removed before surgery, please bring cases for contacts or glasses         Medication List            Accurate as of October 3, 2024  9:16 AM. Always use your most recent med list.                acetaminophen 500 mg tablet  Commonly known as: Tylenol  Medication Adjustments for Surgery: Take/Use as prescribed     allopurinol 100 mg tablet  Commonly known as: Zyloprim  Take 2 tablets (200 mg) by mouth once daily.  Medication Adjustments for Surgery: Take/Use as prescribed     amLODIPine 5 mg tablet  Commonly known as: Norvasc  Take 1 tablet (5 mg) by mouth once daily. as directed  Medication Adjustments for Surgery: Take/Use as prescribed     apixaban 5 mg tablet  Commonly known as: Eliquis  Take 1 tablet (5 mg) by mouth 2 times a day.  Medication Adjustments for Surgery: Take last dose 3 days before surgery  Notes to patient: Last dose 10/4     aspirin 81 mg EC tablet  Medication Adjustments for Surgery: Take/Use as  prescribed     cholecalciferol 25 MCG (1000 UT) tablet  Commonly known as: Vitamin D-3  Additional Medication Adjustments for Surgery: Take last dose 7 days before surgery     clindamycin 300 mg capsule  Commonly known as: Cleocin  TAKE 2 CAPSULES BY MOUTH 1 HOUR PRIOR TO DENTAL PROCEDURE.  Medication Adjustments for Surgery: Take/Use as prescribed     hydroCHLOROthiazide 12.5 mg tablet  Commonly known as: Microzide  Take 1 tablet (12.5 mg) by mouth once daily.  Medication Adjustments for Surgery: Take/Use as prescribed     lisinopril 20 mg tablet  Take 1 tablet (20 mg) by mouth once daily.  Medication Adjustments for Surgery: Take last dose 1 day (24 hours) before surgery     metFORMIN  mg 24 hr tablet  Commonly known as: Glucophage-XR  TAKE ONE TABLET BY MOUTH daily in the MORNING and then take two tablets in the evening  Medication Adjustments for Surgery: Do Not take on the morning of surgery     multivitamin tablet  Additional Medication Adjustments for Surgery: Take last dose 7 days before surgery     sildenafil 100 mg tablet  Commonly known as: Viagra  Medication Adjustments for Surgery: Take last dose 3 days before surgery  Notes to patient: Do not take after 10/4

## 2024-10-05 DIAGNOSIS — R73.9 HYPERGLYCEMIA: ICD-10-CM

## 2024-10-05 DIAGNOSIS — E79.0 ASYMPTOMATIC HYPERURICEMIA: ICD-10-CM

## 2024-10-05 DIAGNOSIS — I10 HYPERTENSION, UNSPECIFIED TYPE: ICD-10-CM

## 2024-10-05 LAB
ATRIAL RATE: 365 BPM
Q ONSET: 226 MS
QRS COUNT: 11 BEATS
QRS DURATION: 78 MS
QT INTERVAL: 402 MS
QTC CALCULATION(BAZETT): 436 MS
QTC FREDERICIA: 425 MS
R AXIS: -11 DEGREES
T AXIS: 19 DEGREES
T OFFSET: 427 MS
VENTRICULAR RATE: 71 BPM

## 2024-10-07 RX ORDER — METFORMIN HYDROCHLORIDE 500 MG/1
TABLET, EXTENDED RELEASE ORAL
Qty: 270 TABLET | Refills: 1 | Status: SHIPPED | OUTPATIENT
Start: 2024-10-07

## 2024-10-07 RX ORDER — ALLOPURINOL 100 MG/1
200 TABLET ORAL DAILY
Qty: 180 TABLET | Refills: 1 | Status: SHIPPED | OUTPATIENT
Start: 2024-10-07

## 2024-10-07 RX ORDER — AMLODIPINE BESYLATE 5 MG/1
5 TABLET ORAL DAILY
Qty: 90 TABLET | Refills: 1 | Status: SHIPPED | OUTPATIENT
Start: 2024-10-07

## 2024-10-08 ENCOUNTER — ANESTHESIA (OUTPATIENT)
Dept: OPERATING ROOM | Facility: HOSPITAL | Age: 79
End: 2024-10-08
Payer: MEDICARE

## 2024-10-08 ENCOUNTER — OFFICE VISIT (OUTPATIENT)
Dept: SURGICAL ONCOLOGY | Facility: HOSPITAL | Age: 79
End: 2024-10-08
Payer: MEDICARE

## 2024-10-08 ENCOUNTER — APPOINTMENT (OUTPATIENT)
Dept: RADIOLOGY | Facility: HOSPITAL | Age: 79
End: 2024-10-08
Payer: MEDICARE

## 2024-10-08 ENCOUNTER — HOSPITAL ENCOUNTER (OUTPATIENT)
Facility: HOSPITAL | Age: 79
Discharge: HOME | End: 2024-10-09
Attending: UROLOGY | Admitting: UROLOGY
Payer: MEDICARE

## 2024-10-08 DIAGNOSIS — I48.91 ATRIAL FIBRILLATION, UNSPECIFIED TYPE (MULTI): ICD-10-CM

## 2024-10-08 DIAGNOSIS — N28.89 URETERAL MASS: ICD-10-CM

## 2024-10-08 LAB
ABO GROUP (TYPE) IN BLOOD: NORMAL
GLUCOSE BLD MANUAL STRIP-MCNC: 117 MG/DL (ref 74–99)
GLUCOSE BLD MANUAL STRIP-MCNC: 139 MG/DL (ref 74–99)
RH FACTOR (ANTIGEN D): NORMAL

## 2024-10-08 PROCEDURE — 52354 CYSTOURETERO W/BIOPSY: CPT | Performed by: UROLOGY

## 2024-10-08 PROCEDURE — 3600000008 HC OR TIME - EACH INCREMENTAL 1 MINUTE - PROCEDURE LEVEL THREE: Performed by: UROLOGY

## 2024-10-08 PROCEDURE — 88305 TISSUE EXAM BY PATHOLOGIST: CPT | Performed by: PATHOLOGY

## 2024-10-08 PROCEDURE — C1758 CATHETER, URETERAL: HCPCS | Performed by: UROLOGY

## 2024-10-08 PROCEDURE — 52332 CYSTOSCOPY AND TREATMENT: CPT | Performed by: UROLOGY

## 2024-10-08 PROCEDURE — 82947 ASSAY GLUCOSE BLOOD QUANT: CPT

## 2024-10-08 PROCEDURE — 74420 UROGRAPHY RTRGR +-KUB: CPT | Performed by: UROLOGY

## 2024-10-08 PROCEDURE — 96372 THER/PROPH/DIAG INJ SC/IM: CPT | Performed by: STUDENT IN AN ORGANIZED HEALTH CARE EDUCATION/TRAINING PROGRAM

## 2024-10-08 PROCEDURE — 2550000001 HC RX 255 CONTRASTS: Performed by: UROLOGY

## 2024-10-08 PROCEDURE — 3600000003 HC OR TIME - INITIAL BASE CHARGE - PROCEDURE LEVEL THREE: Performed by: UROLOGY

## 2024-10-08 PROCEDURE — 2780000003 HC OR 278 NO HCPCS: Performed by: UROLOGY

## 2024-10-08 PROCEDURE — 99100 ANES PT EXTEME AGE<1 YR&>70: CPT | Performed by: ANESTHESIOLOGY

## 2024-10-08 PROCEDURE — A52332 PR CYSTOSCOPY,INSERT URETERAL STENT: Performed by: ANESTHESIOLOGY

## 2024-10-08 PROCEDURE — 7100000002 HC RECOVERY ROOM TIME - EACH INCREMENTAL 1 MINUTE: Performed by: UROLOGY

## 2024-10-08 PROCEDURE — 2720000007 HC OR 272 NO HCPCS: Performed by: UROLOGY

## 2024-10-08 PROCEDURE — 93005 ELECTROCARDIOGRAM TRACING: CPT | Mod: 59

## 2024-10-08 PROCEDURE — 88112 CYTOPATH CELL ENHANCE TECH: CPT | Performed by: STUDENT IN AN ORGANIZED HEALTH CARE EDUCATION/TRAINING PROGRAM

## 2024-10-08 PROCEDURE — 2500000005 HC RX 250 GENERAL PHARMACY W/O HCPCS

## 2024-10-08 PROCEDURE — 93010 ELECTROCARDIOGRAM REPORT: CPT | Performed by: INTERNAL MEDICINE

## 2024-10-08 PROCEDURE — 7100000011 HC EXTENDED STAY RECOVERY HOURLY - NURSING UNIT

## 2024-10-08 PROCEDURE — 99222 1ST HOSP IP/OBS MODERATE 55: CPT | Performed by: STUDENT IN AN ORGANIZED HEALTH CARE EDUCATION/TRAINING PROGRAM

## 2024-10-08 PROCEDURE — 3700000002 HC GENERAL ANESTHESIA TIME - EACH INCREMENTAL 1 MINUTE: Performed by: UROLOGY

## 2024-10-08 PROCEDURE — 2500000004 HC RX 250 GENERAL PHARMACY W/ HCPCS (ALT 636 FOR OP/ED): Performed by: STUDENT IN AN ORGANIZED HEALTH CARE EDUCATION/TRAINING PROGRAM

## 2024-10-08 PROCEDURE — C1769 GUIDE WIRE: HCPCS | Performed by: UROLOGY

## 2024-10-08 PROCEDURE — 88305 TISSUE EXAM BY PATHOLOGIST: CPT | Mod: TC,SUR | Performed by: UROLOGY

## 2024-10-08 PROCEDURE — 36415 COLL VENOUS BLD VENIPUNCTURE: CPT | Performed by: UROLOGY

## 2024-10-08 PROCEDURE — 2500000005 HC RX 250 GENERAL PHARMACY W/O HCPCS: Performed by: UROLOGY

## 2024-10-08 PROCEDURE — 2500000004 HC RX 250 GENERAL PHARMACY W/ HCPCS (ALT 636 FOR OP/ED)

## 2024-10-08 PROCEDURE — 2500000001 HC RX 250 WO HCPCS SELF ADMINISTERED DRUGS (ALT 637 FOR MEDICARE OP): Performed by: STUDENT IN AN ORGANIZED HEALTH CARE EDUCATION/TRAINING PROGRAM

## 2024-10-08 PROCEDURE — 2500000004 HC RX 250 GENERAL PHARMACY W/ HCPCS (ALT 636 FOR OP/ED): Performed by: UROLOGY

## 2024-10-08 PROCEDURE — 7100000001 HC RECOVERY ROOM TIME - INITIAL BASE CHARGE: Performed by: UROLOGY

## 2024-10-08 PROCEDURE — C2617 STENT, NON-COR, TEM W/O DEL: HCPCS | Performed by: UROLOGY

## 2024-10-08 PROCEDURE — 3700000001 HC GENERAL ANESTHESIA TIME - INITIAL BASE CHARGE: Performed by: UROLOGY

## 2024-10-08 PROCEDURE — 88112 CYTOPATH CELL ENHANCE TECH: CPT | Mod: TC,MCY | Performed by: UROLOGY

## 2024-10-08 DEVICE — STENT, INLAY OPTIMA, 6FR X 26CM: Type: IMPLANTABLE DEVICE | Site: URETER | Status: FUNCTIONAL

## 2024-10-08 RX ORDER — HYDROMORPHONE HYDROCHLORIDE 1 MG/ML
0.2 INJECTION, SOLUTION INTRAMUSCULAR; INTRAVENOUS; SUBCUTANEOUS EVERY 5 MIN PRN
Status: DISCONTINUED | OUTPATIENT
Start: 2024-10-08 | End: 2024-10-08 | Stop reason: HOSPADM

## 2024-10-08 RX ORDER — POLYETHYLENE GLYCOL 3350 17 G/17G
17 POWDER, FOR SOLUTION ORAL DAILY
Status: DISCONTINUED | OUTPATIENT
Start: 2024-10-08 | End: 2024-10-09 | Stop reason: HOSPADM

## 2024-10-08 RX ORDER — ONDANSETRON HYDROCHLORIDE 2 MG/ML
INJECTION, SOLUTION INTRAVENOUS AS NEEDED
Status: DISCONTINUED | OUTPATIENT
Start: 2024-10-08 | End: 2024-10-08

## 2024-10-08 RX ORDER — SODIUM CHLORIDE, SODIUM LACTATE, POTASSIUM CHLORIDE, CALCIUM CHLORIDE 600; 310; 30; 20 MG/100ML; MG/100ML; MG/100ML; MG/100ML
100 INJECTION, SOLUTION INTRAVENOUS CONTINUOUS
Status: DISCONTINUED | OUTPATIENT
Start: 2024-10-08 | End: 2024-10-08 | Stop reason: HOSPADM

## 2024-10-08 RX ORDER — ONDANSETRON HYDROCHLORIDE 2 MG/ML
4 INJECTION, SOLUTION INTRAVENOUS ONCE AS NEEDED
Status: DISCONTINUED | OUTPATIENT
Start: 2024-10-08 | End: 2024-10-08 | Stop reason: HOSPADM

## 2024-10-08 RX ORDER — LIDOCAINE HYDROCHLORIDE 10 MG/ML
0.1 INJECTION, SOLUTION EPIDURAL; INFILTRATION; INTRACAUDAL; PERINEURAL ONCE
Status: DISCONTINUED | OUTPATIENT
Start: 2024-10-08 | End: 2024-10-08 | Stop reason: HOSPADM

## 2024-10-08 RX ORDER — FENTANYL CITRATE 50 UG/ML
INJECTION, SOLUTION INTRAMUSCULAR; INTRAVENOUS AS NEEDED
Status: DISCONTINUED | OUTPATIENT
Start: 2024-10-08 | End: 2024-10-08

## 2024-10-08 RX ORDER — CIPROFLOXACIN 2 MG/ML
400 INJECTION, SOLUTION INTRAVENOUS ONCE
Status: DISCONTINUED | OUTPATIENT
Start: 2024-10-08 | End: 2024-10-08 | Stop reason: HOSPADM

## 2024-10-08 RX ORDER — DROPERIDOL 2.5 MG/ML
0.62 INJECTION, SOLUTION INTRAMUSCULAR; INTRAVENOUS ONCE AS NEEDED
Status: DISCONTINUED | OUTPATIENT
Start: 2024-10-08 | End: 2024-10-08 | Stop reason: HOSPADM

## 2024-10-08 RX ORDER — OXYCODONE HYDROCHLORIDE 5 MG/1
5 TABLET ORAL EVERY 4 HOURS PRN
Status: DISCONTINUED | OUTPATIENT
Start: 2024-10-08 | End: 2024-10-08 | Stop reason: HOSPADM

## 2024-10-08 RX ORDER — ACETAMINOPHEN 325 MG/1
975 TABLET ORAL EVERY 6 HOURS
Status: DISCONTINUED | OUTPATIENT
Start: 2024-10-08 | End: 2024-10-09 | Stop reason: HOSPADM

## 2024-10-08 RX ORDER — SODIUM CHLORIDE, SODIUM LACTATE, POTASSIUM CHLORIDE, CALCIUM CHLORIDE 600; 310; 30; 20 MG/100ML; MG/100ML; MG/100ML; MG/100ML
20 INJECTION, SOLUTION INTRAVENOUS CONTINUOUS
Status: DISCONTINUED | OUTPATIENT
Start: 2024-10-08 | End: 2024-10-09

## 2024-10-08 RX ORDER — CEFAZOLIN 1 G/1
INJECTION, POWDER, FOR SOLUTION INTRAVENOUS AS NEEDED
Status: DISCONTINUED | OUTPATIENT
Start: 2024-10-08 | End: 2024-10-08

## 2024-10-08 RX ORDER — LIDOCAINE HYDROCHLORIDE 20 MG/ML
INJECTION, SOLUTION INFILTRATION; PERINEURAL AS NEEDED
Status: DISCONTINUED | OUTPATIENT
Start: 2024-10-08 | End: 2024-10-08

## 2024-10-08 RX ORDER — PROPOFOL 10 MG/ML
INJECTION, EMULSION INTRAVENOUS AS NEEDED
Status: DISCONTINUED | OUTPATIENT
Start: 2024-10-08 | End: 2024-10-08

## 2024-10-08 RX ORDER — MIDAZOLAM HYDROCHLORIDE 1 MG/ML
INJECTION INTRAMUSCULAR; INTRAVENOUS AS NEEDED
Status: DISCONTINUED | OUTPATIENT
Start: 2024-10-08 | End: 2024-10-08

## 2024-10-08 RX ORDER — ASPIRIN 81 MG/1
81 TABLET ORAL DAILY
Status: DISCONTINUED | OUTPATIENT
Start: 2024-10-08 | End: 2024-10-09 | Stop reason: HOSPADM

## 2024-10-08 RX ORDER — ROCURONIUM BROMIDE 10 MG/ML
INJECTION, SOLUTION INTRAVENOUS AS NEEDED
Status: DISCONTINUED | OUTPATIENT
Start: 2024-10-08 | End: 2024-10-08

## 2024-10-08 RX ORDER — DOCUSATE SODIUM 100 MG/1
100 CAPSULE, LIQUID FILLED ORAL 2 TIMES DAILY
Status: DISCONTINUED | OUTPATIENT
Start: 2024-10-08 | End: 2024-10-09 | Stop reason: HOSPADM

## 2024-10-08 RX ORDER — HEPARIN SODIUM 5000 [USP'U]/ML
7500 INJECTION, SOLUTION INTRAVENOUS; SUBCUTANEOUS EVERY 8 HOURS SCHEDULED
Status: DISCONTINUED | OUTPATIENT
Start: 2024-10-08 | End: 2024-10-09 | Stop reason: HOSPADM

## 2024-10-08 RX ORDER — ALLOPURINOL 100 MG/1
200 TABLET ORAL DAILY
Status: DISCONTINUED | OUTPATIENT
Start: 2024-10-08 | End: 2024-10-09 | Stop reason: HOSPADM

## 2024-10-08 RX ORDER — CHLORHEXIDINE GLUCONATE 40 MG/ML
SOLUTION TOPICAL DAILY PRN
Status: DISCONTINUED | OUTPATIENT
Start: 2024-10-08 | End: 2024-10-08 | Stop reason: HOSPADM

## 2024-10-08 RX ORDER — INSULIN LISPRO 100 [IU]/ML
0-10 INJECTION, SOLUTION INTRAVENOUS; SUBCUTANEOUS
Status: CANCELLED | OUTPATIENT
Start: 2024-10-08

## 2024-10-08 RX ORDER — VANCOMYCIN 2 GRAM/500 ML IN 0.9 % SODIUM CHLORIDE INTRAVENOUS
2000 ONCE
Status: DISCONTINUED | OUTPATIENT
Start: 2024-10-08 | End: 2024-10-08 | Stop reason: HOSPADM

## 2024-10-08 RX ORDER — HYDROMORPHONE HYDROCHLORIDE 1 MG/ML
0.5 INJECTION, SOLUTION INTRAMUSCULAR; INTRAVENOUS; SUBCUTANEOUS EVERY 5 MIN PRN
Status: DISCONTINUED | OUTPATIENT
Start: 2024-10-08 | End: 2024-10-08 | Stop reason: HOSPADM

## 2024-10-08 RX ORDER — SODIUM CHLORIDE 0.9 G/100ML
IRRIGANT IRRIGATION AS NEEDED
Status: DISCONTINUED | OUTPATIENT
Start: 2024-10-08 | End: 2024-10-08 | Stop reason: HOSPADM

## 2024-10-08 RX ORDER — OXYCODONE HYDROCHLORIDE 5 MG/1
10 TABLET ORAL EVERY 4 HOURS PRN
Status: DISCONTINUED | OUTPATIENT
Start: 2024-10-08 | End: 2024-10-08 | Stop reason: HOSPADM

## 2024-10-08 SDOH — SOCIAL STABILITY: SOCIAL INSECURITY: HAVE YOU HAD THOUGHTS OF HARMING ANYONE ELSE?: YES

## 2024-10-08 SDOH — HEALTH STABILITY: MENTAL HEALTH: CURRENT SMOKER: 0

## 2024-10-08 ASSESSMENT — LIFESTYLE VARIABLES
HOW MANY STANDARD DRINKS CONTAINING ALCOHOL DO YOU HAVE ON A TYPICAL DAY: PATIENT DECLINED
SKIP TO QUESTIONS 9-10: 0
AUDIT-C TOTAL SCORE: -1
HOW OFTEN DO YOU HAVE A DRINK CONTAINING ALCOHOL: PATIENT DECLINED
HOW OFTEN DO YOU HAVE 6 OR MORE DRINKS ON ONE OCCASION: PATIENT DECLINED
AUDIT-C TOTAL SCORE: -1

## 2024-10-08 ASSESSMENT — COGNITIVE AND FUNCTIONAL STATUS - GENERAL
STANDING UP FROM CHAIR USING ARMS: A LITTLE
PATIENT BASELINE BEDBOUND: NO
MOBILITY SCORE: 23
DAILY ACTIVITIY SCORE: 24

## 2024-10-08 ASSESSMENT — ACTIVITIES OF DAILY LIVING (ADL)
DRESSING YOURSELF: INDEPENDENT
PATIENT'S MEMORY ADEQUATE TO SAFELY COMPLETE DAILY ACTIVITIES?: YES
HEARING - RIGHT EAR: HEARING AID
JUDGMENT_ADEQUATE_SAFELY_COMPLETE_DAILY_ACTIVITIES: YES
BATHING: INDEPENDENT
HEARING - LEFT EAR: HEARING AID
GROOMING: INDEPENDENT
FEEDING YOURSELF: INDEPENDENT
ADEQUATE_TO_COMPLETE_ADL: YES
WALKS IN HOME: INDEPENDENT
TOILETING: INDEPENDENT

## 2024-10-08 ASSESSMENT — COLUMBIA-SUICIDE SEVERITY RATING SCALE - C-SSRS
6. HAVE YOU EVER DONE ANYTHING, STARTED TO DO ANYTHING, OR PREPARED TO DO ANYTHING TO END YOUR LIFE?: NO
2. HAVE YOU ACTUALLY HAD ANY THOUGHTS OF KILLING YOURSELF?: NO
1. IN THE PAST MONTH, HAVE YOU WISHED YOU WERE DEAD OR WISHED YOU COULD GO TO SLEEP AND NOT WAKE UP?: NO

## 2024-10-08 ASSESSMENT — PATIENT HEALTH QUESTIONNAIRE - PHQ9
2. FEELING DOWN, DEPRESSED OR HOPELESS: NOT AT ALL
SUM OF ALL RESPONSES TO PHQ9 QUESTIONS 1 & 2: 0
1. LITTLE INTEREST OR PLEASURE IN DOING THINGS: NOT AT ALL

## 2024-10-08 ASSESSMENT — PAIN SCALES - GENERAL: PAIN_LEVEL: 0

## 2024-10-08 NOTE — OP NOTE
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  ASA: ASA status not filed in the log.  Anesthesia Staff: Anesthesiologist: Todd Olguin MD  Anesthesia Resident: Alvaro Townsend MD  Estimated Blood Loss: 5mL  Intra-op Medications:   Administrations occurring from 0953 to 1248 on 10/08/24:   Medication Name Total Dose   sodium chloride 0.9 % irrigation solution 4,000 mL              Anesthesia Record               Intraprocedure I/O Totals       None           Specimen:   ID Type Source Tests Collected by Time   1 : URINE CYTOLOGY Non-Gynecologic Cytology BLADDER WASH CYTOLOGY CONSULTATION (NON-GYNECOLOGIC) Colt Arias MD 10/8/2024 1122   2 : URINE CYTOLOGY Non-Gynecologic Cytology BLADDER WASH CYTOLOGY CONSULTATION (NON-GYNECOLOGIC) Colt Arias MD 10/8/2024 1150   3 : URETERAL TUMOR BIOPSY Tissue URETER BIOPSY RIGHT SURGICAL PATHOLOGY EXAM Alexander Terrazas MD 10/8/2024 1154        Staff:   Circulator: Afua  Scrub Person: Pérez Peterson Circulator: Gaurav  Scrub Person: Abdulaziz         Drains and/or Catheters:   Urethral Catheter Coude 20 Fr. (Active)       Findings: Numerous bladder diverticuli, moderately fixed and friable bladder neck and prostate, no evidence of bladder tumor, stones, or lesions. Distal right ureteral mass measures approximately 3 cm based on retrograde pyelogram  along with moderate right hydronephrosis, unable to visualize ureter proximal to mass due to significant inflammation and inability to pass scope safely. No other obvious evidence of filling defects on retrograde pyelogram. Successful of ureteral mass.     Indications: Joseph Hayden is an 79 y.o. 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     The patient was seen in the preoperative area. The risks, benefits, complications, treatment options, non-operative alternatives, expected recovery and outcomes were discussed with the patient. The possibilities of reaction to medication, pulmonary aspiration, injury to surrounding structures, bleeding, recurrent infection, the need for additional procedures, failure to diagnose a condition, and creating a complication requiring transfusion or operation were discussed with the patient. The patient concurred with the proposed plan, giving informed consent.  The site of surgery was properly noted/marked if necessary per policy. The patient has been actively warmed in preoperative area. Preoperative antibiotics have been ordered and given within 2 hours of incision. Venous thrombosis prophylaxis have been ordered including bilateral sequential compression devices    Procedure Details: Patient consented to procedure in preoperative area. Risks and benefits discussed. Patient was marked on the [facility] side. Allergies were reviewed and preoperative antibiotics were administered. Patient was brought to the operating room and placed in supine position on the operating room table. A timeout was performed. All were in agreement. Patient underwent general anesthesia without complication. They were repositioned in dorsal lithotomy and prepped and draped in the usual sterile fashion. A 21 fr rigid cystoscope was used to perform cystourethroscopy. Urethra was normal. Bilateral UOs were in normal orthotopic position. No masses or stones were identified.  Sensor wire was placed  through a 5Fr ureteral catheter to the level of the right kidney as confirmed on fluoroscopy. The catheter was then removed. A dual lumen was placed over the wire. Retrograde pyelogram was performed.     Retrograde pyelogram interpretation: Right moderate hydroureteronephrosis proximal to approximately 3 cm filling defect noted at the distal right ureter. No other evidence of filling defects or opacities seen on retrograde pyelogram.    A second wire was advanced to the kidney through the dual lumen catheter. Then the dual lumen was removed. One wire was secured as a safety wire. We then advanced the semi-rigid ureteroscope over working wire up to the right ureteral mass which appeared calcified, papillary and nodular. Notably, the distal right ureter appeared tortuous and inflamed. We were able to visualize the mass, but were unable to advance our scope over a wire beyond the tumor. We collected urine cytology at this point. The decision was made not to endure any further ureteral trauma. We proceeded to utilize the flexible ureteroscopy biopsy forceps and obtained numerous biopsies of the suspicious lesion. There was not significant bleeding noted. We then backed the ureteroscope out.     We back-loaded the cystoscope over the wire and re-entered the bladder. A 6x26 JJ stent was placed over the safety wire with proximal curl noted in kidney on fluoro and distal curl in the bladder on direct visualization. Bladder was drained with a 20Fr coude catheter due to moderate hematuria present at end of case, instruments removed. This concluded the procedure. Patient was awoken from anesthesia without complication and transferred to PACU in stable condition.    Complications:  None; patient tolerated the procedure well.    Disposition: PACU - hemodynamically stable.  Condition: stable     PLAN:  -Maintain khan in PACU and will attempt TOV prior to DC home  -Ureteral stent to remain in place for 1 week  -Will follow-up  urine cytology and ureteral mass biopsy  -Will refer patient to Dr. Newsome for consideration of right distal ureterectomy      Attending Attestation: I was present and scrubbed for the entire procedure.    Colt Arias  Phone Number: [phone]

## 2024-10-08 NOTE — ANESTHESIA PROCEDURE NOTES
Airway  Date/Time: 10/8/2024 11:00 AM  Urgency: elective    Airway not difficult    Staffing  Performed: resident   Authorized by: Todd Olguin MD    Performed by: Alvaro Townsend MD  Patient location during procedure: OR    Indications and Patient Condition  Indications for airway management: anesthesia  Spontaneous Ventilation: absent  Sedation level: deep  Preoxygenated: yes  Patient position: sniffing  Mask difficulty assessment: 1 - vent by mask  Planned trial extubation    Final Airway Details  Final airway type: endotracheal airway      Successful airway: ETT  Cuffed: yes   Successful intubation technique: video laryngoscopy  Facilitating devices/methods: intubating stylet  Endotracheal tube insertion site: oral  Blade: Damian  Blade size: #4  ETT size (mm): 7.0  Placement verified by: chest auscultation and capnometry   Measured from: lips  ETT to lips (cm): 22  Number of attempts at approach: 1

## 2024-10-08 NOTE — CONSULTS
Inpatient consult to Cardiology  Consult performed by: Dave Graf MD  Consult ordered by: Colt Arias MD        History Of Present Illness:    Joseph Hayden is a 79 y.o. male with history of persistent atrial flutter/fibrillation on apixaban s/p DCCV 1/2024 with only brief return to NSR, DM, HTN, ANTONIO, prostate cancer who is admitted to urology service following biopsy of R ureteral mass and R ureteral stent placement.    Following the procedure today the patient was bradycardic to the low 40s, but BP remained stable 120s/70s. No EKG done during the bradycardia or tele strips available. Telemetry reviewed and demonstrates slow a fib and intermittent possible flutter with variable block, rates 50s-60s. On interview, he states that he has felt ok since waking up from anesthesia. No LH/dizziness, palpitations, chest pain, dyspnea. Also denies recent orthopnea, PND. Has chronic LE edema that is unchanged.      Last Recorded Vitals:  Vitals:    10/08/24 1515 10/08/24 1530 10/08/24 1638 10/08/24 1641   BP: 138/76 144/78 125/76    BP Location:       Patient Position:       Pulse: 53 53 (!) 42 54   Resp: 10 13 11    Temp:   35.6 °C (96.1 °F)    TempSrc:       SpO2: 94% 97% 93%    Weight:       Height:         Physical Exam:  GEN: Lying in hospital bed, NAD  HEENT: MMM, anicteric sclera  CV: Irregular rhythm, nl S1/2, no mrg  RESP: Breathing comfortably on 2L NC, CTAB  ABD: Soft, NTND  EXT: WWP, b/l 2+ LE edema with chronic venous stasis changes  NEURO: AOx3, no FND  PSYCH: appropriate affect      Last Labs:  CBC - 10/3/2024:  9:22 AM  6.3 12.7 229    38.1      CMP - 10/3/2024:  9:22 AM  9.3 7.3 21 --- 0.6   _ 4.1 20 42      PTT - 9/25/2024:  1:07 PM  1.4   16.2 33     Hemoglobin A1C   Date/Time Value Ref Range Status   07/22/2024 02:27 PM 5.8 (H) see below % Final   08/22/2023 02:19 PM 6.1 (A) % Final     Comment:          Diagnosis of Diabetes-Adults   Non-Diabetic: < or = 5.6%   Increased risk for developing  diabetes: 5.7-6.4%   Diagnostic of diabetes: > or = 6.5%  .       Monitoring of Diabetes                Age (y)     Therapeutic Goal (%)   Adults:          >18           <7.0   Pediatrics:    13-18           <7.5                   7-12           <8.0                   0- 6            7.5-8.5   American Diabetes Association. Diabetes Care 33(S1), Jan 2010.     02/22/2023 01:10 PM 6.2 (A) % Final     Comment:          Diagnosis of Diabetes-Adults   Non-Diabetic: < or = 5.6%   Increased risk for developing diabetes: 5.7-6.4%   Diagnostic of diabetes: > or = 6.5%  .       Monitoring of Diabetes                Age (y)     Therapeutic Goal (%)   Adults:          >18           <7.0   Pediatrics:    13-18           <7.5                   7-12           <8.0                   0- 6            7.5-8.5   American Diabetes Association. Diabetes Care 33(S1), Jan 2010.       LDL Calculated   Date/Time Value Ref Range Status   07/22/2024 02:27 PM 95 <=99 mg/dL Final     Comment:                                 Near   Borderline      AGE      Desirable  Optimal    High     High     Very High     0-19 Y     0 - 109     ---    110-129   >/= 130     ----    20-24 Y     0 - 119     ---    120-159   >/= 160     ----      >24 Y     0 -  99   100-129  130-159   160-189     >/=190       VLDL   Date/Time Value Ref Range Status   07/22/2024 02:27 PM 37 0 - 40 mg/dL Final   08/22/2023 02:19 PM 49 (H) 0 - 40 mg/dL Final   07/13/2022 12:10 PM 45 (H) 0 - 40 mg/dL Final   09/15/2021 12:05 PM 42 (H) 0 - 40 mg/dL Final      Last I/O:  No intake/output data recorded.    Past Cardiology Tests (Last 3 Years):  EKG:  ECG 12 lead (Clinic Performed) 10/03/2024: A flutter with variable block     Echo:  Transthoracic echo (TTE) complete 01/12/2024  CONCLUSIONS:   1. Left ventricular systolic function is normal with a 55-60% estimated ejection fraction.   2. There is mildly reduced right ventricular systolic function.   3. Mild mitral valve regurgitation.    4. Ascending aortal mildly enlarged at 4.0 cm.   5. The patient is in atrial fibrillation which may influence the estimate of left ventricular function and transvalvular flows.      Cath:  No results found for this or any previous visit from the past 1095 days.    Stress Test:  No results found for this or any previous visit from the past 1095 days.    Cardiac Imaging:  No results found for this or any previous visit from the past 1095 days.      Past Medical History:  He has a past medical history of Arrhythmia, Atrial fibrillation (Multi), Diabetes mellitus (Multi), Easy bruising, GERD (gastroesophageal reflux disease), HTN (hypertension), ANTONIO (obstructive sleep apnea), Prostate cancer (Multi), and Type 2 diabetes mellitus.    He has no past medical history of Cerebral vascular accident (Multi), Chronic kidney disease, Disease of thyroid gland, Dysphagia, Seizure disorder (Multi), or TIA (transient ischemic attack).    Past Surgical History:  He has a past surgical history that includes Colonoscopy (05/28/2013); Hip Arthroplasty (Right, 02/19/2019); and Cataract extraction w/  intraocular lens implant (Bilateral).      Social History:  He reports that he has quit smoking. His smoking use included cigarettes. He has never used smokeless tobacco. He reports current alcohol use of about 2.0 standard drinks of alcohol per week. He reports that he does not use drugs.    Family History:  Family History   Problem Relation Name Age of Onset    Colon cancer Father      Cancer Brother          Allergies:  Penicillins, Tamsulosin, and Adhesive tape-silicones    Inpatient Medications:  Scheduled medications   Medication Dose Route Frequency    acetaminophen  975 mg oral q6h    allopurinol  200 mg oral Daily    aspirin  81 mg oral Daily    docusate sodium  100 mg oral BID    heparin (porcine)  7,500 Units subcutaneous q8h KRUPA    polyethylene glycol  17 g oral Daily     PRN medications   Medication     Continuous Medications    Medication Dose Last Rate    lactated Ringer's  20 mL/hr 20 mL/hr (10/08/24 6154)     Outpatient Medications:  Current Outpatient Medications   Medication Instructions    acetaminophen (TYLENOL) 500 mg, oral, As needed    allopurinol (ZYLOPRIM) 200 mg, oral, Daily    amLODIPine (NORVASC) 5 mg, oral, Daily, as directed    apixaban (ELIQUIS) 5 mg, oral, 2 times daily    aspirin 81 mg, oral, Daily    cholecalciferol (Vitamin D-3) 25 MCG (1000 UT) tablet 1 tablet, oral, 2 times daily    clindamycin (Cleocin) 300 mg capsule TAKE 2 CAPSULES BY MOUTH 1 HOUR PRIOR TO DENTAL PROCEDURE.    hydroCHLOROthiazide (MICROZIDE) 12.5 mg, oral, Daily    lisinopril 20 mg, oral, Daily    metFORMIN  mg 24 hr tablet TAKE ONE TABLET BY MOUTH DAILY IN THE MORNING AND THEN TAKE TWO TABLETS IN THE EVENING    multivitamin tablet oral    sildenafil (Viagra) 100 mg tablet oral        Assessment/Plan   Joseph Hayden is a 79 y.o. male with history of persistent atrial flutter/fibrillation on apixaban s/p DCCV 1/2024 with only brief return to NSR, DM, HTN, ANTONIO, prostate cancer who is admitted to urology service following biopsy of R ureteral mass and R ureteral stent placement.    # Atrial fibrillation/flutter with variable block  # Asymptomatic bradycardia  Cannot see telemetry from when he was more bradycardic but has been in atrial fibrillation with slow ventricular response. No symptoms or significant pauses on telemetry. Likely was bradycardic as a result of anesthesia.  - please order 12-lead EKG  - continue on telemetry overnight  - avoid beta blockers or other negative chronotropic agents  - resume apixaban when felt safe from a procedural standpoint    Thank you for involving us in this patient's care. To be staffed in the AM.     General Cardiology Consult Pager: [phone] (weekday 7AM-6PM and weekend 7AM-2PM) and other: [phone]  EP Consult Pager: [phone] (weekday 7AM-6PM and weekend 7AM-2PM) and other: [phone]  CICU Fellow Pager: [phone]  anytime  EP Device Nurse Pager: [phone] (weekday 7AM-4PM)  Advanced Heart Failure Consult Pager: [phone] anytime    Dave Graf MD  Cardiology Fellow

## 2024-10-08 NOTE — ANESTHESIA PREPROCEDURE EVALUATION
Patient: Joseph Hayden    Procedure Information       Date/Time: 10/08/24 0953    Procedures:       Ureteroscopy (Right: Pelvis)      Biopsy Ureter (Right)    Location: [facility] OR 01 / Virtual [facility] OR    Surgeons: Colt Arias MD            Relevant Problems   Cardiac   (+) Persistent atrial fibrillation (Multi)   (+) Primary hypertension      Pulmonary   (+) Pulmonary hypertension (Multi)       Clinical information reviewed:   Tobacco  Allergies  Meds   Med Hx  Surg Hx   Fam Hx  Soc Hx        NPO Detail:  NPO/Void Status  Carbohydrate Drink Given Prior to Surgery? : N  Date of Last Liquid: 10/08/24  Time of Last Liquid: 0600  Date of Last Solid: 10/07/24  Time of Last Solid: 2200  Last Intake Type: Clear fluids  Time of Last Void: 0700         Physical Exam    Airway  Mallampati: III  TM distance: >3 FB  Neck ROM: full     Cardiovascular - normal exam  Rhythm: irregular     Dental - normal exam     Pulmonary - normal exam     Abdominal - normal exam             Anesthesia Plan    History of general anesthesia?: yes  History of complications of general anesthesia?: no    ASA 3     general     The patient is not a current smoker.    intravenous induction   Postoperative administration of opioids is intended.  Trial extubation is planned.  Anesthetic plan and risks discussed with patient.  Use of blood products discussed with patient who consented to blood products.    Plan discussed with attending.

## 2024-10-08 NOTE — SIGNIFICANT EVENT
Rapid Response Nurse Note: [] Rapid Response [x] RADAR alert: Score 6    Pager time:   Arrival time:   Event end time:   Location: [facility] 502  [x] Phone triage     Rapid response initiated by:  [] Rapid Response RN [] Family [] Nursing Supervisor [] Physician   [x] RADAR auto-page [] Sepsis auto-page [] RN [] RT   [] NP/PA [] Other:     Primary reason for call:   [] BAT [] New CPAP/BiPAP [] Bleeding [] Change in mental status   [] Chest pain [] Code blue [] FiO2 >/= 50% [] HR </= 40 bpm   [] HR >/= 130 bpm [] Hyperglycemia [] Hypoglycemia [] RADAR    [] RR </= 8 bpm [] RR >/= 30 bpm [] SBP </= 90 mmHg [] SpO2 < 90%   [] Seizure [] Sepsis [] Staff concern:     Initial VS and/or RADAR VS: T 35.6 °C; HR 54; RR 11; /76; SPO2 93%.    Interventions:  [x] None [] ABG [] Assist w/ICU transfer [] BAT paged    [] Bag mask [] Blood [] Cardioversion [] Code Blue   [] Code blue for intubation [] Code status changed [] Chest x-ray [] EKG   [] IV fluid/bolus [] KUB x-ray [] Labs/cultures [] Medication   [] Nebulizer treatment [] NIPPV (CPAP/BiPAP) [] Oxygen [] Oral airway   [] Peripheral IV [] Palliative care consult [] CT/MRI [] Sepsis protocol    [] Suctioned [] Other:     Outcome:  [] Coded and  [] Code blue for intubation [] Coded and transferred to ICU []  on division   [] Remained on division (no change) [] Remained on division + additional monitoring [] Remained in ED [] Transferred to ED   [] Transferred to ICU [] Transferred to inpatient status [] Transferred for interventions (procedure) [] Transferred to ICU stepdown    [] Transferred to surgery [] Transferred to telemetry [] Sepsis protocol [] STEMI protocol   [] Stroke protocol [x] Bedside nurse instructed to page rapid response for any concerns or acute change in condition/VS     Additional Comments: RADAR auto generated page received by Rapid Response Team RN.  Communicated with bedside RN via [employer] Chat.  VS reviewed.  No interventions  provided by this here Rapid Response Team RN.

## 2024-10-08 NOTE — ANESTHESIA POSTPROCEDURE EVALUATION
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    Vitals Value Taken Time   /63 10/08/24 1345   Temp 36.4 °C (97.5 °F) 10/08/24 1225   Pulse 46 10/08/24 1351   Resp 7 10/08/24 1351   SpO2 94 % 10/08/24 1351   Vitals shown include unfiled device data.    Anesthesia Post Evaluation    Patient location during evaluation: PACU  Patient participation: complete - patient participated  Level of consciousness: awake and alert  Pain score: 0  Pain management: adequate  Airway patency: patent  Cardiovascular status: acceptable and hemodynamically stable  Respiratory status: acceptable and face mask  Hydration status: acceptable  Postoperative Nausea and Vomiting: none        No notable events documented.

## 2024-10-08 NOTE — DISCHARGE INSTRUCTIONS
[facility]    DISCHARGE INSTRUCTIONS     Cystoscopy  Retrograde pyelogram  Ureteroscopy and biopsy  Stent placement    Joseph Hayden    Call [phone] during regular daytime business hours (8:00 am - 5:00 pm) and after 5:00 pm ask for the Urology resident with any questions or concerns.    If it is a life-threatening situation, proceed to the nearest emergency department.        Follow-up appointment: Office cystoscopy and stent removal - 10/25/2024                  Dept: [facility], [address] [place] [address], [address] [address]                   Provider: Colt Arias                  Time: 11:50 AM    Thank you for the opportunity to care for you today.  Your health and healing are very important to us.  We hope we made you feel as comfortable as possible and are committed to your recovery and continued well-being.      The following is a brief overview of your Ureteroscopy procedure today. Some of the information contained on this summary may be confidential.  This information should be kept in your records and should be shared with your regular doctor.    Physicians:   Dr. Arias    Procedure performed: Biopsy of right ureter      What to Expect During your Recovery and Home Care  Anesthesia Side Effects   You received anesthesia today.  You may feel sleepy, tired, or have a sore throat.   You may also feel drowsiness, dizziness, or inability to think clearly.  For your safety, do not drive, drink alcoholic beverages, take any unprescribed medication or make any important decisions for 24 hours.  A responsible adult should be with you for 24 hours.        Activity and Recovery    No heavy lifting today. Rest for the next 24 hours.    Pain Control  Unfortunately, you may experience pain after your procedure.  Frequency and urgency to urinate and mild discomfort are expected. Adequate pain management can include alternative measures to help ease your pain and that can include over  the counter Tylenol/ibuprofen and a heating pad. Do not take more than 4,000mg of Tylenol in a 24-hour period.      You may have also been prescribed Pyridium (for burning sensation) and Ditropan(for bladder spasms) for pain control. Pyridium will turn your urine bright orange.    Nausea/Vomiting   Clear liquids are best tolerated at first. Start slow, advance your diet as tolerated to normal foods. Avoid spicy, greasy, heavy foods at first. Also, you may feel nauseous or like you need to vomit if you take any type of medication on an empty stomach.  Call your physician if you are unable to eat or drink and have persistent vomiting.    Signs of Bleeding   You could have some blood in your urine off and on over the next several weeks. Your urine will be light pink to yellow.    DO NOT RESUME YOUR HOME ELIQUIS UNTIL AFTER 48 HOURS AND ONLY IF YOUR URINE REMAINS CLEAR YELLOW IN COLOR.    Treatment/wound care:   It is okay to shower 24 hours after time of surgery.    Signs of Infection  Signs of infection can include fever, chills, burning sensation with passing of urine, or severe abdominal pain.  If you see any of these occur, please contact your doctor's office at [phone].  Any fever higher than 100.4, especially if associated with an ill feeling, abdominal pain, chills, or nausea should be reported to your surgeon.      Assist in bowel movements/urination  Increase fiber in diet  Increase water (6 to 8 glasses)  Increase walking   Urination should occur within 6 hours of anesthesia  If you have tried these methods and your bladder still feels full and you cannot use the bathroom, please go to your nearest Emergency room/contact your physician.    Additional Instructions:   You also had a stent placed today from your kidney down into your bladder. This helps keep the urinary tract open and prevents blockages of urine flow. The stent can be irritating and can cause some frequency, urgency and blood in your urine  when you go to the bathroom. It is important to drink plenty of water and take medications as prescribed. You may be sent home with Pyridium which turns your urine bright orange. Applying a heating pad to your back will also help with this kind of pain. It will be determined at your follow up appointment when the stent will be removed.

## 2024-10-09 VITALS
HEART RATE: 66 BPM | SYSTOLIC BLOOD PRESSURE: 143 MMHG | TEMPERATURE: 97.5 F | BODY MASS INDEX: 42.21 KG/M2 | WEIGHT: 285 LBS | DIASTOLIC BLOOD PRESSURE: 88 MMHG | OXYGEN SATURATION: 98 % | RESPIRATION RATE: 16 BRPM | HEIGHT: 69 IN

## 2024-10-09 LAB
ANION GAP SERPL CALC-SCNC: 15 MMOL/L (ref 10–20)
BUN SERPL-MCNC: 16 MG/DL (ref 6–23)
CALCIUM SERPL-MCNC: 9.3 MG/DL (ref 8.6–10.6)
CHLORIDE SERPL-SCNC: 102 MMOL/L (ref 98–107)
CO2 SERPL-SCNC: 24 MMOL/L (ref 21–32)
CREAT SERPL-MCNC: 0.92 MG/DL (ref 0.5–1.3)
EGFRCR SERPLBLD CKD-EPI 2021: 84 ML/MIN/1.73M*2
ERYTHROCYTE [DISTWIDTH] IN BLOOD BY AUTOMATED COUNT: 16 % (ref 11.5–14.5)
GLUCOSE BLD MANUAL STRIP-MCNC: 138 MG/DL (ref 74–99)
GLUCOSE SERPL-MCNC: 117 MG/DL (ref 74–99)
HCT VFR BLD AUTO: 39.7 % (ref 41–52)
HGB BLD-MCNC: 12.9 G/DL (ref 13.5–17.5)
LABORATORY COMMENT REPORT: NORMAL
LABORATORY COMMENT REPORT: NORMAL
MAGNESIUM SERPL-MCNC: 1.94 MG/DL (ref 1.6–2.4)
MCH RBC QN AUTO: 29.5 PG (ref 26–34)
MCHC RBC AUTO-ENTMCNC: 32.5 G/DL (ref 32–36)
MCV RBC AUTO: 91 FL (ref 80–100)
NRBC BLD-RTO: 0 /100 WBCS (ref 0–0)
PATH REPORT.FINAL DX SPEC: NORMAL
PATH REPORT.GROSS SPEC: NORMAL
PATH REPORT.RELEVANT HX SPEC: NORMAL
PATH REPORT.TOTAL CANCER: NORMAL
PLATELET # BLD AUTO: 205 X10*3/UL (ref 150–450)
POTASSIUM SERPL-SCNC: 4.4 MMOL/L (ref 3.5–5.3)
RBC # BLD AUTO: 4.38 X10*6/UL (ref 4.5–5.9)
SODIUM SERPL-SCNC: 137 MMOL/L (ref 136–145)
WBC # BLD AUTO: 8.6 X10*3/UL (ref 4.4–11.3)

## 2024-10-09 PROCEDURE — 2500000004 HC RX 250 GENERAL PHARMACY W/ HCPCS (ALT 636 FOR OP/ED): Performed by: STUDENT IN AN ORGANIZED HEALTH CARE EDUCATION/TRAINING PROGRAM

## 2024-10-09 PROCEDURE — 36415 COLL VENOUS BLD VENIPUNCTURE: CPT

## 2024-10-09 PROCEDURE — 2500000005 HC RX 250 GENERAL PHARMACY W/O HCPCS: Performed by: STUDENT IN AN ORGANIZED HEALTH CARE EDUCATION/TRAINING PROGRAM

## 2024-10-09 PROCEDURE — 83735 ASSAY OF MAGNESIUM: CPT | Performed by: STUDENT IN AN ORGANIZED HEALTH CARE EDUCATION/TRAINING PROGRAM

## 2024-10-09 PROCEDURE — 2500000002 HC RX 250 W HCPCS SELF ADMINISTERED DRUGS (ALT 637 FOR MEDICARE OP, ALT 636 FOR OP/ED): Performed by: STUDENT IN AN ORGANIZED HEALTH CARE EDUCATION/TRAINING PROGRAM

## 2024-10-09 PROCEDURE — 80048 BASIC METABOLIC PNL TOTAL CA: CPT

## 2024-10-09 PROCEDURE — 82947 ASSAY GLUCOSE BLOOD QUANT: CPT | Mod: 59

## 2024-10-09 PROCEDURE — 96372 THER/PROPH/DIAG INJ SC/IM: CPT | Performed by: STUDENT IN AN ORGANIZED HEALTH CARE EDUCATION/TRAINING PROGRAM

## 2024-10-09 PROCEDURE — 7100000011 HC EXTENDED STAY RECOVERY HOURLY - NURSING UNIT

## 2024-10-09 PROCEDURE — 85027 COMPLETE CBC AUTOMATED: CPT

## 2024-10-09 PROCEDURE — 2500000001 HC RX 250 WO HCPCS SELF ADMINISTERED DRUGS (ALT 637 FOR MEDICARE OP): Performed by: STUDENT IN AN ORGANIZED HEALTH CARE EDUCATION/TRAINING PROGRAM

## 2024-10-09 RX ORDER — OXYBUTYNIN CHLORIDE 5 MG/1
5 TABLET ORAL 3 TIMES DAILY PRN
Qty: 42 TABLET | Refills: 0 | Status: SHIPPED | OUTPATIENT
Start: 2024-10-09 | End: 2024-10-23

## 2024-10-09 RX ORDER — OXYBUTYNIN CHLORIDE 5 MG/1
5 TABLET ORAL 3 TIMES DAILY PRN
Status: DISCONTINUED | OUTPATIENT
Start: 2024-10-09 | End: 2024-10-09 | Stop reason: HOSPADM

## 2024-10-09 RX ORDER — LIDOCAINE HYDROCHLORIDE 20 MG/ML
1 JELLY TOPICAL ONCE
Status: COMPLETED | OUTPATIENT
Start: 2024-10-09 | End: 2024-10-09

## 2024-10-09 ASSESSMENT — COGNITIVE AND FUNCTIONAL STATUS - GENERAL
MOBILITY SCORE: 22
CLIMB 3 TO 5 STEPS WITH RAILING: A LITTLE
STANDING UP FROM CHAIR USING ARMS: A LITTLE

## 2024-10-09 ASSESSMENT — PAIN SCALES - GENERAL: PAINLEVEL_OUTOF10: 0 - NO PAIN

## 2024-10-09 ASSESSMENT — PAIN - FUNCTIONAL ASSESSMENT: PAIN_FUNCTIONAL_ASSESSMENT: 0-10

## 2024-10-09 NOTE — CARE PLAN
The patient's goals for the shift include      The clinical goals for the shift include patient will remain safe      Problem: Diabetes  Goal: Achieve decreasing blood glucose levels by end of shift  Outcome: Met  Goal: Increase stability of blood glucose readings by end of shift  Outcome: Met  Goal: Decrease in ketones present in urine by end of shift  Outcome: Met  Goal: Maintain electrolyte levels within acceptable range throughout shift  Outcome: Met  Goal: Maintain glucose levels >70mg/dl to <250mg/dl throughout shift  Outcome: Met  Goal: No changes in neurological exam by end of shift  Outcome: Met  Goal: Learn about and adhere to nutrition recommendations by end of shift  Outcome: Met  Goal: Vital signs within normal range for age by end of shift  Outcome: Met  Goal: Increase self care and/or family involovement by end of shift  Outcome: Met  Goal: Receive DSME education by end of shift  Outcome: Met

## 2024-10-09 NOTE — DISCHARGE SUMMARY
Discharge Diagnosis  Ureteral mass    Issues Requiring Follow-Up      Test Results Pending At Discharge  Pending Labs       Order Current Status    Cytology Consultation (Non-Gynecologic) In process    Surgical Pathology Exam In process            Hospital Course  79 y.o. male with a history of a-flutter/fib (apixaban), DM, HTN, ANTONIO, prostate cancer and 3cm distsl R ureteral mass s/p biopsy and stent placement 10/08. Patient had rapid response in PACU for bradycardia in which cardiology were consulted. Patient recovered appropriately.  Patient may restart eliquis in 2-3 days when urine clears. Patient to follow up in 2 weeks with Dr. Arias for stent removal. Patient will be referred outpatient with Dr. Newsome for ureteral mass. Patient to be discharged home today 10/9.      Pertinent Physical Exam At Time of Discharge  Physical Exam     Gen -  No acute distress     Neuro - Alert, oriented, conversant     CV -  Regular rate, normotensive      Pulm - Symmetric chest rise, non-labored breathing on 2L NC     Abd - Soft, non-tender, non-distended      Ext - Warm, well perfused     Skin - without jaunice       Psych - appropriate tone, affect      - Sharma draining clear pink urine   Home Medications     Medication List      CHANGE how you take these medications     apixaban 5 mg tablet; Commonly known as: Eliquis; Take 1 tablet (5 mg)   by mouth 2 times a day. Please restart 2-3 days after discharge once urine   clears; What changed: additional instructions     CONTINUE taking these medications     acetaminophen 500 mg tablet; Commonly known as: Tylenol   allopurinol 100 mg tablet; Commonly known as: Zyloprim; Take two tablets   by mouth once daily.   amLODIPine 5 mg tablet; Commonly known as: Norvasc; TAKE 1 TABLET (5 MG)   BY MOUTH ONCE DAILY AS DIRECTED   aspirin 81 mg EC tablet   cholecalciferol 25 MCG (1000 UT) tablet; Commonly known as: Vitamin D-3   hydroCHLOROthiazide 12.5 mg tablet; Commonly known as:  Microzide; Take 1   tablet (12.5 mg) by mouth once daily.   lisinopril 20 mg tablet; Take 1 tablet (20 mg) by mouth once daily.   metFORMIN  mg 24 hr tablet; Commonly known as: Glucophage-XR; TAKE   ONE TABLET BY MOUTH DAILY IN THE MORNING AND THEN TAKE TWO TABLETS IN THE   EVENING   multivitamin tablet   sildenafil 100 mg tablet; Commonly known as: Viagra     STOP taking these medications     clindamycin 300 mg capsule; Commonly known as: Cleocin       Outpatient Follow-Up  Future Appointments   Date Time Provider Department Center   10/25/2024 11:40 AM MD FLORENCIA CallahanDANAY [facility]   12/5/2024 12:00 PM Rochelle Pinzon MD [id number] [facility]   2/25/2025  2:40 PM Ozzie Nguyen DO [id number] [facility]       Marianna Colon MD

## 2024-10-09 NOTE — SIGNIFICANT EVENT
Rapid Response Nurse Note: [] Rapid Response [x] RADAR alert: Score 6    Pager time:   Arrival time:   Event end time:   Location: [place] [address]  [] Phone triage     Rapid response initiated by:  [] Rapid Response RN [] Family [] Nursing Supervisor [] Physician   [x] RADAR auto-page [] Sepsis auto-page [] RN [] RT   [] NP/PA [] Other:     Primary reason for call:   [] BAT [] New CPAP/BiPAP [] Bleeding [] Change in mental status   [] Chest pain [] Code blue [] FiO2 >/= 50% [] HR </= 40 bpm   [] HR >/= 130 bpm [] Hyperglycemia [] Hypoglycemia [] RADAR    [] RR </= 8 bpm [] RR >/= 30 bpm [] SBP </= 90 mmHg [] SpO2 < 90%   [] Seizure [] Sepsis [] Staff concern:     Initial VS and/or RADAR VS: T 35.6 °C; HR 54; RR 11; /76; SPO2 93%.    Interventions:  [x] None [] ABG [] Assist w/ICU transfer [] BAT paged    [] Bag mask [] Blood [] Cardioversion [] Code Blue   [] Code blue for intubation [] Code status changed [] Chest x-ray [] EKG   [] IV fluid/bolus [] KUB x-ray [] Labs/cultures [] Medication   [] Nebulizer treatment [] NIPPV (CPAP/BiPAP) [] Oxygen [] Oral airway   [] Peripheral IV [] Palliative care consult [] CT/MRI [] Sepsis protocol    [] Suctioned [] Other:     Outcome:  [] Coded and  [] Code blue for intubation [] Coded and transferred to ICU []  on division   [x] Remained on division (no change) [] Remained on division + additional monitoring [] Remained in ED [] Transferred to ED   [] Transferred to ICU [] Transferred to inpatient status [] Transferred for interventions (procedure) [] Transferred to ICU stepdown    [] Transferred to surgery [] Transferred to telemetry [] Sepsis protocol [] STEMI protocol   [] Stroke protocol [x] Bedside nurse instructed to page rapid response for any concerns or acute change in condition/VS     Additional Comments: RADAR auto generated page received by Rapid Response Team RN.  Communicated with bedside RN via [employer] Chat.  VS reviewed.  No interventions  provided by this here Rapid Response Team RN.  Patient remains on [facility] 1051.

## 2024-10-09 NOTE — SIGNIFICANT EVENT
Cardiology Brief Update Note:    Overnight telemetry reviewed, rates remain improved largely 60s-80s, persistent a fib/flutter with variable block, one pause 2.2 seconds seen.    OK to discharge from a cardiology perspective. Resume apixaban when felt safe from surgical standpoint.     Dave Graf MD  Cardiology Fellow

## 2024-10-16 LAB
ATRIAL RATE: 416 BPM
Q ONSET: 225 MS
QRS COUNT: 10 BEATS
QRS DURATION: 78 MS
QT INTERVAL: 424 MS
QTC CALCULATION(BAZETT): 412 MS
QTC FREDERICIA: 416 MS
R AXIS: -6 DEGREES
T AXIS: 33 DEGREES
T OFFSET: 437 MS
VENTRICULAR RATE: 57 BPM

## 2024-10-22 LAB
LABORATORY COMMENT REPORT: NORMAL
PATH REPORT.FINAL DX SPEC: NORMAL
PATH REPORT.GROSS SPEC: NORMAL
PATH REPORT.RELEVANT HX SPEC: NORMAL
PATH REPORT.TOTAL CANCER: NORMAL

## 2024-10-24 ENCOUNTER — APPOINTMENT (OUTPATIENT)
Dept: UROLOGY | Facility: HOSPITAL | Age: 79
End: 2024-10-24
Payer: MEDICARE

## 2024-10-24 RX ORDER — CIPROFLOXACIN 500 MG/1
500 TABLET ORAL ONCE
Status: COMPLETED | OUTPATIENT
Start: 2024-10-25 | End: 2024-10-25

## 2024-10-25 ENCOUNTER — PROCEDURE VISIT (OUTPATIENT)
Dept: UROLOGY | Facility: HOSPITAL | Age: 79
End: 2024-10-25
Payer: MEDICARE

## 2024-10-25 DIAGNOSIS — Z79.2 PROPHYLACTIC ANTIBIOTIC: ICD-10-CM

## 2024-10-25 PROCEDURE — 99214 OFFICE O/P EST MOD 30 MIN: CPT | Performed by: UROLOGY

## 2024-10-25 PROCEDURE — 52310 CYSTOSCOPY AND TREATMENT: CPT | Performed by: UROLOGY

## 2024-10-25 PROCEDURE — 2500000001 HC RX 250 WO HCPCS SELF ADMINISTERED DRUGS (ALT 637 FOR MEDICARE OP): Performed by: UROLOGY

## 2024-10-25 NOTE — PROGRESS NOTES
FUV     HISTORY OF PRESENT ILLNESS:   Joseph Hayden is a 79 y.o. 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 Radford  PAST MEDICAL HISTORY:  Past Medical History:   Diagnosis Date    Arrhythmia     Atrial fibrillation (Multi)     Diabetes mellitus (Multi)     Easy bruising     GERD (gastroesophageal reflux disease)     HTN (hypertension)     ANTONIO (obstructive sleep apnea)     Prostate cancer (Multi)     Type 2 diabetes mellitus    - 4+3=7 prostate cancer  - Radiation to treat prostate cancer in 2016, Dr. Radford    PAST SURGICAL HISTORY:  Past Surgical History:   Procedure Laterality Date    CATARACT EXTRACTION W/  INTRAOCULAR LENS IMPLANT Bilateral     COLONOSCOPY  05/28/2013    Complete Colonoscopy    HIP ARTHROPLASTY Right 02/19/2019        ALLERGIES:   Allergies   Allergen Reactions    Penicillins Other    Tamsulosin Unknown    Adhesive Tape-Silicones Itching and Rash     rash/itching        MEDICATIONS:   Current Outpatient Medications   Medication Instructions    acetaminophen (TYLENOL) 500 mg, oral, As needed    allopurinol (ZYLOPRIM) 200 mg, oral, Daily    amLODIPine (NORVASC) 5 mg, oral, Daily, as directed    apixaban (ELIQUIS) 5 mg, oral, 2 times daily, Please restart 2-3 days after discharge once urine clears    aspirin 81 mg, oral, Daily    cholecalciferol (Vitamin D-3) 25 MCG (1000 UT) tablet 1 tablet, oral, 2 times daily    hydroCHLOROthiazide (MICROZIDE) 12.5 mg, oral, Daily    lisinopril 20 mg, oral, Daily    metFORMIN  mg 24 hr tablet TAKE ONE TABLET BY MOUTH DAILY IN THE MORNING AND THEN TAKE TWO TABLETS IN THE EVENING    multivitamin tablet oral    sildenafil (Viagra) 100 mg tablet oral        PHYSICAL EXAM:  There were no vitals taken for this visit.  Constitutional: Patient appears well-developed and well-nourished. No  "distress.    Pulmonary/Chest: Effort normal. No respiratory distress.   Abdominal: Soft, ND NT  : WNL  Musculoskeletal: Normal range of motion.    Neurological: Alert and oriented to person, place, and time.  Psychiatric: Normal mood and affect. Behavior is normal. Thought content normal.      Labs:  Lab Results   Component Value Date    TESTOSTERONE 346 03/08/2018     Lab Results   Component Value Date    PSA 0.4 07/22/2024     No components found for: \"CBC\"  Lab Results   Component Value Date    CREATININE 0.92 10/09/2024     No components found for: \"TESTOTMS\"  No results found for: \"TESTF\"  Imaging:  - CT Urogram on 9/5/24 demonstrated Right distal ureteral mass suspicious for transitional cell carcinoma until proven otherwise. Mild hydronephrosis. Recommend referral to Urology for further evaluation and treatment recommendations. Yellow Alert. Bilateral renal cysts.  Pathology of right ureter: Microscopic examination reveals minute fragments of mostly-denuded mucosa with detached fragments of atypical epithelioid cells displaying high-grade cytologic features, highly suspicious for malignancy. The specimen is extremely scant, fragmented and crushed precluding further characterization   -- Results reviewed with patient. Patient reassured.      Discussion:  Doing well, no complaints. Reports he has some urinary stress incontinence and wears a pad. The cystoscopy was completed today due to hematuria and demonstrated scar tissue and post radiation changes but no tumors, stones or lesions. Cytology sent. 1 abx given to patient in clinic today. We reviewed his imaging and pathology which was concerning for a right ureteral mass. Will refer him to Dr Newsome for further management. Patient is on Eliquis 5mg for A Fib.   Assessment:      1. Prophylactic antibiotic  ciprofloxacin (Cipro) tablet 500 mg            Joseph Hayden is a 79 y.o. male here for cystoscopy     Plan:   Refer to Dr Newsome for management of " right ureteral mass and possible cystoureterectomy possible nephroureterectomy.  Possibly a good candidate for Dr Newsome's new clinical trial.   All questions and concerns were addressed. Patient verbalizes understanding and has no other questions at this time.      Scribe Attestation  By signing my name below, IShelby Scribe   attest that this documentation has been prepared under the direction and in the presence of Colt Arias MD.

## 2024-11-05 DIAGNOSIS — I10 HYPERTENSION, UNSPECIFIED TYPE: ICD-10-CM

## 2024-11-08 RX ORDER — LISINOPRIL 20 MG/1
20 TABLET ORAL DAILY
Qty: 90 TABLET | Refills: 1 | Status: SHIPPED | OUTPATIENT
Start: 2024-11-08

## 2024-12-01 DIAGNOSIS — I48.91 ATRIAL FIBRILLATION, UNSPECIFIED TYPE (MULTI): ICD-10-CM

## 2024-12-02 RX ORDER — APIXABAN 5 MG/1
5 TABLET, FILM COATED ORAL 2 TIMES DAILY
Qty: 60 TABLET | Refills: 11 | Status: SHIPPED | OUTPATIENT
Start: 2024-12-02

## 2024-12-05 ENCOUNTER — APPOINTMENT (OUTPATIENT)
Dept: PRIMARY CARE | Facility: CLINIC | Age: 79
End: 2024-12-05
Payer: MEDICARE

## 2024-12-10 ENCOUNTER — OFFICE VISIT (OUTPATIENT)
Dept: UROLOGY | Facility: HOSPITAL | Age: 79
End: 2024-12-10
Payer: MEDICARE

## 2024-12-10 VITALS
BODY MASS INDEX: 42.16 KG/M2 | WEIGHT: 285.5 LBS | OXYGEN SATURATION: 99 % | DIASTOLIC BLOOD PRESSURE: 71 MMHG | RESPIRATION RATE: 20 BRPM | HEART RATE: 76 BPM | SYSTOLIC BLOOD PRESSURE: 131 MMHG | TEMPERATURE: 97.2 F

## 2024-12-10 DIAGNOSIS — N28.89 URETERAL MASS: Primary | ICD-10-CM

## 2024-12-10 PROCEDURE — G2211 COMPLEX E/M VISIT ADD ON: HCPCS | Performed by: STUDENT IN AN ORGANIZED HEALTH CARE EDUCATION/TRAINING PROGRAM

## 2024-12-10 PROCEDURE — 99215 OFFICE O/P EST HI 40 MIN: CPT | Performed by: STUDENT IN AN ORGANIZED HEALTH CARE EDUCATION/TRAINING PROGRAM

## 2024-12-10 PROCEDURE — 1126F AMNT PAIN NOTED NONE PRSNT: CPT | Performed by: STUDENT IN AN ORGANIZED HEALTH CARE EDUCATION/TRAINING PROGRAM

## 2024-12-10 PROCEDURE — 1036F TOBACCO NON-USER: CPT | Performed by: STUDENT IN AN ORGANIZED HEALTH CARE EDUCATION/TRAINING PROGRAM

## 2024-12-10 PROCEDURE — 3075F SYST BP GE 130 - 139MM HG: CPT | Performed by: STUDENT IN AN ORGANIZED HEALTH CARE EDUCATION/TRAINING PROGRAM

## 2024-12-10 PROCEDURE — 3078F DIAST BP <80 MM HG: CPT | Performed by: STUDENT IN AN ORGANIZED HEALTH CARE EDUCATION/TRAINING PROGRAM

## 2024-12-10 ASSESSMENT — PAIN SCALES - GENERAL: PAINLEVEL_OUTOF10: 0-NO PAIN

## 2024-12-10 NOTE — PROGRESS NOTES
Lilly Hayden is a 79 y.o. male with right ureteral mass referred by Dr. Nielson.     He had seen Dr. Nielson 10/25, a cystoscopy was done due to hematuria that demonstrated scar tissue and post radiation changes but no tumors, stones or lesions. Radiation to treat prostate cancer in 2016, Dr. Radford.     He has a ureteral mass.  He is s/p ureteroscopy.  This showed a  3cm distsl R ureteral mass s/p biopsy and stent placement 10/08.  Stent is now out.  Biopsy was high grade.  The ureter proximal to the mass was not inspected.    Imaging and pathology which was concerning for a right ureteral mass.     He lives and is able to function independently.    PMHx: Pulmonary HTN, persistent atrial fibrillation, ureteral mass, macroscopic hematuria. Prostate cancer    PSHx:  has a past surgical history that includes Colonoscopy (05/28/2013); Hip Arthroplasty (Right, 02/19/2019); and Cataract extraction w/  intraocular lens implant (Bilateral).      Social:   Tobacco Use: Medium Risk (10/8/2024)    Patient History     Smoking Tobacco Use: Former     Smokeless Tobacco Use: Never     Passive Exposure: Not on file         Family: Cancer-related family history includes Cancer in his brother; Colon cancer in his father.        Review of Systems    All systems were reviewed. Anything negative was noted in the HPI.    Objective   Physical Exam  Gen: No acute distress      Psych: Alert and oriented x3      Neuro:  Normal ROM     Resp: Nonlabored respirations      CV: Regular rate and rhythm      Abd: S, NT, ND.     : Deferred     Skin: Warm, dry and intact without rashes      Lymphatics: No peripheral edema           Past Medical History:   Diagnosis Date    Arrhythmia     Atrial fibrillation (Multi)     Diabetes mellitus (Multi)     Easy bruising     GERD (gastroesophageal reflux disease)     HTN (hypertension)     ANTONIO (obstructive sleep apnea)     Prostate cancer (Multi)     Type 2 diabetes mellitus          Past  Surgical History:   Procedure Laterality Date    CATARACT EXTRACTION W/  INTRAOCULAR LENS IMPLANT Bilateral     COLONOSCOPY  05/28/2013    Complete Colonoscopy    HIP ARTHROPLASTY Right 02/19/2019     CT Urogram on 9/5/24 demonstrated Right distal ureteral mass suspicious for transitional cell carcinoma until proven otherwise. Mild hydronephrosis. Recommend referral to Urology for further evaluation and treatment recommendations. Yellow Alert. Bilateral renal cysts.  Pathology of right ureter: Microscopic examination reveals minute fragments of mostly-denuded mucosa with detached fragments of atypical epithelioid cells displaying high-grade cytologic features, highly suspicious for malignancy. The specimen is extremely scant, fragmented and crushed precluding further characterization     Assessment & Plan  Ureteral mass  He went over his imaging and biopsy.  He has high grade cancer cells.  He has a 3 cm distal ureteral mass.  We went over options.  These issues are treated surgically.  The options for surgery include either a distal ureterectomy and reimplant of the ureter or a removal of the kidney and ureter.  We went over these options.  We went over how the ureter proximal to the mass was not able to be inspected and there is risk for disease there now or in the future.  We went over the benefits of distal ureterectomy and reimplant with regards to preservation of renal function.  We advised that he would likely plan for a distal ureterectomy robotically with endoscopy of the ureter.  If cancer more proximal we would convert to removal of the entire kidney and ureter.  If no cancer, we would try to save his kidney.  We went over a randomized trial we are running in this disease regarding whether or not to do a lymph node dissection or not.  We offered him appointments to meet with our research team and I would advise to help us with this study if he elects for surgery.  I offered him another appointment as he  is without family today to discuss.    He will call us this week with his decision surgery vs no surgery and which type of surgery.  We will set this up accordingly.       PLAN:  Distal urethrectomy robotically, he plans to discuss with his family and call once he makes a decision.   He will think about participating in clinical research                                           Scribe Attestation  By signing my name below, Ashlee OSBORN Scribe   attest that this documentation has been prepared under the direction and in the presence of Romulo Newsome MD MPH

## 2024-12-10 NOTE — ASSESSMENT & PLAN NOTE
He went over his imaging and biopsy.  He has high grade cancer cells.  He has a 3 cm distal ureteral mass.  We went over options.  These issues are treated surgically.  The options for surgery include either a distal ureterectomy and reimplant of the ureter or a removal of the kidney and ureter.  We went over these options.  We went over how the ureter proximal to the mass was not able to be inspected and there is risk for disease there now or in the future.  We went over the benefits of distal ureterectomy and reimplant with regards to preservation of renal function.  We advised that he would likely plan for a distal ureterectomy robotically with endoscopy of the ureter.  If cancer more proximal we would convert to removal of the entire kidney and ureter.  If no cancer, we would try to save his kidney.  We went over a randomized trial we are running in this disease regarding whether or not to do a lymph node dissection or not.  We offered him appointments to meet with our research team and I would advise to help us with this study if he elects for surgery.  I offered him another appointment as he is without family today to discuss.    He will call us this week with his decision surgery vs no surgery and which type of surgery.  We will set this up accordingly.       PLAN:  Distal urethrectomy robotically, he plans to discuss with his family and call once he makes a decision.   He will think about participating in clinical research

## 2024-12-18 DIAGNOSIS — I10 HYPERTENSION, UNSPECIFIED TYPE: ICD-10-CM

## 2024-12-19 ENCOUNTER — TELEPHONE (OUTPATIENT)
Dept: PRIMARY CARE | Facility: CLINIC | Age: 79
End: 2024-12-19
Payer: MEDICARE

## 2024-12-19 RX ORDER — HYDROCHLOROTHIAZIDE 12.5 MG/1
12.5 TABLET ORAL DAILY
Qty: 90 TABLET | Refills: 1 | Status: SHIPPED | OUTPATIENT
Start: 2024-12-19

## 2025-01-15 ENCOUNTER — TELEPHONE (OUTPATIENT)
Dept: PRIMARY CARE | Facility: CLINIC | Age: 80
End: 2025-01-15

## 2025-01-15 ENCOUNTER — APPOINTMENT (OUTPATIENT)
Dept: PRIMARY CARE | Facility: CLINIC | Age: 80
End: 2025-01-15
Payer: MEDICARE

## 2025-01-15 VITALS
BODY MASS INDEX: 41.79 KG/M2 | HEART RATE: 78 BPM | DIASTOLIC BLOOD PRESSURE: 80 MMHG | SYSTOLIC BLOOD PRESSURE: 139 MMHG | OXYGEN SATURATION: 96 % | RESPIRATION RATE: 16 BRPM | WEIGHT: 283 LBS

## 2025-01-15 DIAGNOSIS — Z00.00 ROUTINE GENERAL MEDICAL EXAMINATION AT HEALTH CARE FACILITY: ICD-10-CM

## 2025-01-15 DIAGNOSIS — I48.19 PERSISTENT ATRIAL FIBRILLATION (MULTI): ICD-10-CM

## 2025-01-15 DIAGNOSIS — N28.89 URETERAL MASS: ICD-10-CM

## 2025-01-15 DIAGNOSIS — R73.9 HYPERGLYCEMIA: ICD-10-CM

## 2025-01-15 DIAGNOSIS — Z00.00 WELLNESS EXAMINATION: Primary | ICD-10-CM

## 2025-01-15 DIAGNOSIS — E79.0 HYPERURICEMIA: ICD-10-CM

## 2025-01-15 DIAGNOSIS — C61 PROSTATE CANCER (MULTI): ICD-10-CM

## 2025-01-15 DIAGNOSIS — Z00.00 PHYSICAL EXAM, ANNUAL: ICD-10-CM

## 2025-01-15 PROBLEM — I48.4 ATYPICAL ATRIAL FLUTTER (MULTI): Status: RESOLVED | Noted: 2025-01-15 | Resolved: 2025-01-15

## 2025-01-15 PROBLEM — R31.0 MACROSCOPIC HEMATURIA: Status: RESOLVED | Noted: 2024-07-22 | Resolved: 2025-01-15

## 2025-01-15 PROBLEM — I48.4 ATYPICAL ATRIAL FLUTTER (MULTI): Status: ACTIVE | Noted: 2025-01-15

## 2025-01-15 LAB — POC HEMOGLOBIN A1C: 6 % (ref 4.2–6.5)

## 2025-01-15 PROCEDURE — 1036F TOBACCO NON-USER: CPT | Performed by: INTERNAL MEDICINE

## 2025-01-15 PROCEDURE — 1170F FXNL STATUS ASSESSED: CPT | Performed by: INTERNAL MEDICINE

## 2025-01-15 PROCEDURE — G0439 PPPS, SUBSEQ VISIT: HCPCS | Performed by: INTERNAL MEDICINE

## 2025-01-15 PROCEDURE — 1123F ACP DISCUSS/DSCN MKR DOCD: CPT | Performed by: INTERNAL MEDICINE

## 2025-01-15 PROCEDURE — 99397 PER PM REEVAL EST PAT 65+ YR: CPT | Performed by: INTERNAL MEDICINE

## 2025-01-15 PROCEDURE — 1159F MED LIST DOCD IN RCRD: CPT | Performed by: INTERNAL MEDICINE

## 2025-01-15 PROCEDURE — 3079F DIAST BP 80-89 MM HG: CPT | Performed by: INTERNAL MEDICINE

## 2025-01-15 PROCEDURE — 83036 HEMOGLOBIN GLYCOSYLATED A1C: CPT | Performed by: INTERNAL MEDICINE

## 2025-01-15 PROCEDURE — 3075F SYST BP GE 130 - 139MM HG: CPT | Performed by: INTERNAL MEDICINE

## 2025-01-15 RX ORDER — CLINDAMYCIN HYDROCHLORIDE 300 MG/1
300 CAPSULE ORAL 2 TIMES DAILY
COMMUNITY

## 2025-01-15 ASSESSMENT — ACTIVITIES OF DAILY LIVING (ADL)
DOING_HOUSEWORK: INDEPENDENT
MANAGING_FINANCES: INDEPENDENT
GROCERY_SHOPPING: INDEPENDENT
DRESSING: INDEPENDENT
BATHING: INDEPENDENT
TAKING_MEDICATION: INDEPENDENT

## 2025-01-15 ASSESSMENT — ENCOUNTER SYMPTOMS
DEPRESSION: 0
SHORTNESS OF BREATH: 1
OCCASIONAL FEELINGS OF UNSTEADINESS: 1
LOSS OF SENSATION IN FEET: 0

## 2025-01-15 NOTE — PATIENT INSTRUCTIONS
Continue working on your diet, discuss more in detail with urooncology about your recent biopsy result, continue current medications,Return sooner than 6 m as needed.

## 2025-01-15 NOTE — PROGRESS NOTES
Subjective   Patient ID: Joseph Hayden is a 79 y.o. male who presents for Medicare Annual Wellness Visit Subsequent.Discuss urology issues    HPI Diet is stable, unable to exercise, no working on wood hobby due to weather.  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 He is unsure of next step if watch/wait or  distal uretherectomy robotic.  His jose a hematuria resolved , persistend dribbling and incontinence.  Cardiology consult during hospitalization for above surgery,  due to bradycardia. He continues seeing outpatient cardiologist every 6 mm for chronic a fibrillation.  Edema in ankles improved wearing compression stockings and diuretic    Review of Systems   Respiratory:  Positive for shortness of breath.    Cardiovascular:  Positive for leg swelling.        Palpitations triggered by cold weather   All other systems reviewed and are negative.      Objective   /80 (BP Location: Right arm, Patient Position: Sitting, BP Cuff Size: Large adult)   Pulse 78   Resp 16   Wt 128 kg (283 lb)   SpO2 96%   BMI 41.79 kg/m²     Physical Exam  11lb down from july  Eyes- Conjunctiva clear  Neck-no thyromegaly  Cardiac- irregular irregular ,  no murmurs  Lung- clear to auscultation  GI- present  bowel sounds, nontender, no rebound  MSK- no deformities  Extremities- no edema, good distal pulses  Neuro- non focal, oriented x 3  Psychiatric- pleasant, well groomed, no hallucinations    Assessment/Plan   Problem List Items Addressed This Visit             ICD-10-CM    Hyperglycemia R73.9     Non fasting glucose in the 130 in hospital , today hmga1c 6.1, continue weight lost.         Relevant Orders    POCT glycosylated hemoglobin (Hb A1C) manually resulted (Completed)    Hyperuricemia E79.0     Continue current meds.         Physical exam, annual Z00.00    Wellness examination - Primary Z00.00     Discussed diet, exercise, immunizations, and  screening appropriate for their age.           Persistent atrial fibrillation (Multi) I48.19     Continue anticoagulation, cardiology every 6 m         Ureteral mass N28.89     Pt will scheudle another antonia with current team to learn more details of both options.          Body mass index (BMI) 40.0-44.9, adult (Multi) Z68.41     Other Visit Diagnoses         Codes    Prostate cancer (Multi)     C61    Routine general medical examination at health care facility     Z00.00    Relevant Orders    1 Year Follow Up In Primary Care - Wellness Exam

## 2025-02-19 NOTE — PROGRESS NOTES
Primary Care Physician: Rochelle Pinzon MD  Date of Visit: 02/25/2025  2:40 PM EST  Location of visit: [facility]     Chief Complaint:   Follow up visit     HPI / Summary:   Joseph Hayden is a 79 y.o. male Htn, gout, obesity referred regarding abnormal CT scan and concern for pulmonary hypertension.  Right pulmonary artery measuring around 3 cm on CT chest.  Not noted previously. On ECG dated 12/19/23 he was found to be in afib. Underwent elective cardioversion 1/18/24 with subsequent quick reversion back to afib based on recent monitor results.    Says he did not notice any difference post cardioversion.    Continues to be without symptoms.    Denies any chest pain, shortness of breath, lightheadedness dizziness presyncope or syncope.    Mentions some occasional gas like pain  Some mild bradycardia after prostate biopsy in October         EKG  10 8/24 Atrial fibrillation with slow ventricular response     Monitor 1/25/24 - 2/1/24 - 99% afib/flutter burden      ECHO 1/12/24:  1. Left ventricular systolic function is normal with a 55-60% estimated ejection fraction.   2. There is mildly reduced right ventricular systolic function.   3. Mild mitral valve regurgitation.   4. Ascending aortal mildly enlarged at 4.0 cm.   5. The patient is in atrial fibrillation which may influence the estimate of left ventricular function and transvalvular flows.        CT angio chest 2023: ascending aorta 3.9 cm. Isolated dilation of right pulmonary artery  CT angio chest 2021: ascending aorta 3.9 cm.   CT chest 2019: ascending aorta 4 cm  CT chest 2015: ascending aorta 4.1 cm        Past Medical History:  Past Medical History:   Diagnosis Date    Arrhythmia     Atrial fibrillation (Multi)     Atypical atrial flutter (Multi) 01/15/2025    Diabetes mellitus (Multi)     Easy bruising     GERD (gastroesophageal reflux disease)     HTN (hypertension)     Macroscopic hematuria 07/22/2024    ANTONIO (obstructive sleep apnea)      Prostate cancer (Multi)     Type 2 diabetes mellitus     Ureteral mass 09/19/2024        Past Surgical History:  Past Surgical History:   Procedure Laterality Date    CATARACT EXTRACTION W/  INTRAOCULAR LENS IMPLANT Bilateral     COLONOSCOPY  05/28/2013    Complete Colonoscopy    HIP ARTHROPLASTY Right 02/19/2019          Social History:  He reports that he has quit smoking. His smoking use included cigarettes. He has never used smokeless tobacco. He reports current alcohol use of about 2.0 standard drinks of alcohol per week. He reports that he does not use drugs.    Family History:  family history includes Cancer in his brother; Colon cancer in his father.      Allergies:  Allergies   Allergen Reactions    Penicillins Other    Tamsulosin Unknown    Adhesive Tape-Silicones Itching and Rash     rash/itching       Outpatient Medications:  Current Outpatient Medications   Medication Instructions    acetaminophen (TYLENOL) 500 mg, As needed    allopurinol (ZYLOPRIM) 200 mg, oral, Daily    amLODIPine (NORVASC) 5 mg, oral, Daily, as directed    cholecalciferol (Vitamin D-3) 25 MCG (1000 UT) tablet 1 tablet, 2 times daily    clindamycin (CLEOCIN) 300 mg, 2 times daily    Eliquis 5 mg, oral, 2 times daily    hydroCHLOROthiazide (MICROZIDE) 12.5 mg, oral, Daily    lisinopril 20 mg, oral, Daily    metFORMIN  mg 24 hr tablet TAKE ONE TABLET BY MOUTH DAILY IN THE MORNING AND THEN TAKE TWO TABLETS IN THE EVENING    multivitamin tablet Take by mouth.    sildenafil (Viagra) 100 mg tablet Take by mouth.       Physical Exam:  GENERAL: alert, cooperative, pleasant, in no acute distress  SKIN: warm, dry, no rash.  NECK: no JVD  CARDIAC: Irregular rate and rhythm with no rubs, murmurs, or gallops  CHEST: Normal respiratory efforts, lungs clear to auscultation bilaterally.  ABDOMEN: soft, nontender, nondistended  EXTREMITIES: no edema  NEURO: Alert and oriented x 3.  Grossly normal.  Moves all 4 extremities.      Vitals:     "02/25/25 1448   BP: 102/66   BP Location: Left arm   Patient Position: Sitting   Pulse: 70   SpO2: 94%   Weight: 127 kg (280 lb)         Wt Readings from Last 5 Encounters:   01/15/25 128 kg (283 lb)   12/10/24 129 kg (285 lb 7.9 oz)   10/08/24 129 kg (285 lb)   10/03/24 130 kg (287 lb 4.8 oz)   08/20/24 132 kg (290 lb)     Body mass index is 41.35 kg/m².        Last Labs:  CMP:  Recent Labs     10/09/24  0648 10/03/24  0922 09/25/24  1307    137 141   K 4.4 4.3 4.6    104 108*   CO2 24 23 24   ANIONGAP 15 14 14   BUN 16 22 23   CREATININE 0.92 1.00 1.10   EGFR 84 76 67   GLUCOSE 117* 111* 109*     Recent Labs     07/22/24  1427 08/22/23  1419 07/13/22  1210   ALBUMIN 4.1 4.3 4.2   ALKPHOS 42 43 37   ALT 20 27 40   AST 21 26 35   BILITOT 0.6 0.8 0.7     CBC:  Recent Labs     10/09/24  0648 10/03/24  0922 09/25/24  1307   WBC 8.6 6.3 6.2   HGB 12.9* 12.7* 12.3*   HCT 39.7* 38.1* 38.3*    229 196   MCV 91 88 91     COAG:   Recent Labs     09/25/24  1307 02/04/19  1033   INR 1.4* 1.1     ENDO:  Recent Labs     01/15/25  1400 07/22/24  1427 08/22/23  1419 02/22/23  1310 07/13/22  1210 01/26/22  1240 09/15/21  1205   TSH  --  0.91 1.27  --  0.91  --  0.85   HGBA1C 6.0 5.8* 6.1* 6.2* 6.0*   < > 6.1*    < > = values in this interval not displayed.      CARDIAC: No results for input(s): \"LDH\", \"CKMB\", \"TROPHS\", \"BNP\" in the last 02695 hours.    No lab exists for component: \"CK\", \"CKMBP\"  Recent Labs     07/22/24  1427 08/22/23  1419 02/22/23  1310 07/13/22  1210 01/26/22  1240 09/15/21  1205   CHOL 174 179  --  178  --  169   LDLF  --  89  --  93  --  86   LDLCALC 95  --   --   --   --   --    HDL 41.8 41.4  --  39.8*  --  40.9   TRIG 186* 245* 176* 224*   < > 210*    < > = values in this interval not displayed.         Assessment/Plan   79-year-old male with HTN, obesity, gout, afib      Persistent atrial flutter/fib  -s/p DCC 1/2024 but quick reversion back to afib/flutter  -No attributable symptoms.  "   -continue eliquis as CHADsvasc >2  -Average HR in the 60s.  No need for richard blockers at this time.  -Favor continue rate control strategy at this time    Chronic HTN  - Well-controlled on amlodipine 5, HCTZ 12.5, lisinopril 20    Minimal ascending aortic enlargement. No significant concern. And stable in size the past 4 years.  Probably normal variant for him.  Isolated right pulmonary artery enlargement is nonspecific.     Follow up 1 year          Followup Appts:  Future Appointments   Date Time Provider Department Center   7/15/2025 12:00 PM Rochelle Pinzon MD [id number] [facility]   1/15/2026 12:00 PM Rochelle Pinzon MD [id number] [facility]           ____________________________________________________________  Ozzie Nguyen DO  [facility]  [facility]

## 2025-02-25 ENCOUNTER — OFFICE VISIT (OUTPATIENT)
Dept: CARDIOLOGY | Facility: CLINIC | Age: 80
End: 2025-02-25
Payer: MEDICARE

## 2025-02-25 VITALS
OXYGEN SATURATION: 94 % | SYSTOLIC BLOOD PRESSURE: 102 MMHG | BODY MASS INDEX: 41.35 KG/M2 | WEIGHT: 280 LBS | HEART RATE: 70 BPM | DIASTOLIC BLOOD PRESSURE: 66 MMHG

## 2025-02-25 DIAGNOSIS — I48.19 PERSISTENT ATRIAL FIBRILLATION (MULTI): Primary | ICD-10-CM

## 2025-02-25 DIAGNOSIS — I10 CHRONIC HYPERTENSION: ICD-10-CM

## 2025-02-25 PROCEDURE — 1123F ACP DISCUSS/DSCN MKR DOCD: CPT | Performed by: INTERNAL MEDICINE

## 2025-02-25 PROCEDURE — 1159F MED LIST DOCD IN RCRD: CPT | Performed by: INTERNAL MEDICINE

## 2025-02-25 PROCEDURE — G2211 COMPLEX E/M VISIT ADD ON: HCPCS | Performed by: INTERNAL MEDICINE

## 2025-02-25 PROCEDURE — 99214 OFFICE O/P EST MOD 30 MIN: CPT | Performed by: INTERNAL MEDICINE

## 2025-02-25 PROCEDURE — 3074F SYST BP LT 130 MM HG: CPT | Performed by: INTERNAL MEDICINE

## 2025-02-25 PROCEDURE — 1036F TOBACCO NON-USER: CPT | Performed by: INTERNAL MEDICINE

## 2025-02-25 PROCEDURE — 3078F DIAST BP <80 MM HG: CPT | Performed by: INTERNAL MEDICINE

## 2025-03-22 DIAGNOSIS — E79.0 ASYMPTOMATIC HYPERURICEMIA: ICD-10-CM

## 2025-03-22 DIAGNOSIS — R73.9 HYPERGLYCEMIA: ICD-10-CM

## 2025-03-22 DIAGNOSIS — I10 HYPERTENSION, UNSPECIFIED TYPE: ICD-10-CM

## 2025-03-24 RX ORDER — METFORMIN HYDROCHLORIDE 500 MG/1
TABLET, EXTENDED RELEASE ORAL
Qty: 270 TABLET | Refills: 1 | Status: SHIPPED | OUTPATIENT
Start: 2025-03-24

## 2025-03-24 RX ORDER — ALLOPURINOL 100 MG/1
200 TABLET ORAL DAILY
Qty: 180 TABLET | Refills: 1 | Status: SHIPPED | OUTPATIENT
Start: 2025-03-24

## 2025-03-24 RX ORDER — AMLODIPINE BESYLATE 5 MG/1
5 TABLET ORAL DAILY
Qty: 90 TABLET | Refills: 1 | Status: SHIPPED | OUTPATIENT
Start: 2025-03-24

## 2025-03-27 DIAGNOSIS — Z00.00 PHYSICAL EXAM, ANNUAL: Primary | ICD-10-CM

## 2025-03-28 RX ORDER — CLINDAMYCIN HYDROCHLORIDE 300 MG/1
CAPSULE ORAL
Qty: 2 CAPSULE | Refills: 0 | Status: SHIPPED | OUTPATIENT
Start: 2025-03-28

## 2025-06-05 DIAGNOSIS — I10 HYPERTENSION, UNSPECIFIED TYPE: ICD-10-CM

## 2025-06-06 RX ORDER — LISINOPRIL 20 MG/1
20 TABLET ORAL DAILY
Qty: 90 TABLET | Refills: 1 | Status: SHIPPED | OUTPATIENT
Start: 2025-06-06

## 2025-06-06 RX ORDER — HYDROCHLOROTHIAZIDE 12.5 MG/1
TABLET ORAL
Qty: 90 TABLET | Refills: 1 | Status: SHIPPED | OUTPATIENT
Start: 2025-06-06

## 2025-07-15 ENCOUNTER — APPOINTMENT (OUTPATIENT)
Dept: PRIMARY CARE | Facility: CLINIC | Age: 80
End: 2025-07-15
Payer: MEDICARE

## 2025-07-15 VITALS
OXYGEN SATURATION: 98 % | HEART RATE: 80 BPM | WEIGHT: 275 LBS | SYSTOLIC BLOOD PRESSURE: 132 MMHG | RESPIRATION RATE: 18 BRPM | BODY MASS INDEX: 40.61 KG/M2 | DIASTOLIC BLOOD PRESSURE: 84 MMHG

## 2025-07-15 DIAGNOSIS — E79.0 HYPERURICEMIA: ICD-10-CM

## 2025-07-15 DIAGNOSIS — N28.89 URETERAL MASS: ICD-10-CM

## 2025-07-15 DIAGNOSIS — R73.9 HYPERGLYCEMIA: ICD-10-CM

## 2025-07-15 DIAGNOSIS — E55.9 VITAMIN D DEFICIENCY, UNSPECIFIED: ICD-10-CM

## 2025-07-15 DIAGNOSIS — I10 PRIMARY HYPERTENSION: Primary | ICD-10-CM

## 2025-07-15 DIAGNOSIS — I77.810 AORTIC ECTASIA, THORACIC: ICD-10-CM

## 2025-07-15 DIAGNOSIS — C61 PROSTATE CANCER (MULTI): ICD-10-CM

## 2025-07-15 PROCEDURE — 3075F SYST BP GE 130 - 139MM HG: CPT | Performed by: INTERNAL MEDICINE

## 2025-07-15 PROCEDURE — 99214 OFFICE O/P EST MOD 30 MIN: CPT | Performed by: INTERNAL MEDICINE

## 2025-07-15 PROCEDURE — G2211 COMPLEX E/M VISIT ADD ON: HCPCS | Performed by: INTERNAL MEDICINE

## 2025-07-15 PROCEDURE — 1159F MED LIST DOCD IN RCRD: CPT | Performed by: INTERNAL MEDICINE

## 2025-07-15 PROCEDURE — 1036F TOBACCO NON-USER: CPT | Performed by: INTERNAL MEDICINE

## 2025-07-15 PROCEDURE — 3079F DIAST BP 80-89 MM HG: CPT | Performed by: INTERNAL MEDICINE

## 2025-07-15 ASSESSMENT — ENCOUNTER SYMPTOMS
LOSS OF SENSATION IN FEET: 0
DECREASED CONCENTRATION: 0
DIZZINESS: 0
OCCASIONAL FEELINGS OF UNSTEADINESS: 1
TREMORS: 0
DEPRESSION: 0
HEADACHES: 0
LIGHT-HEADEDNESS: 0

## 2025-07-15 NOTE — PATIENT INSTRUCTIONS
Start magnesium glycinate  100 mg daily, and zinc 10 mg daily.Return sooner than 6 months as needed.      Ways to Help Prevent Falls at Home    Quick Tips   ? Ask for help if you need it. Most people want to help!   ? Get up slowly after sitting or laying down   ? Wear a medical alert device or keep cell phone in your pocket   ? Use night lights, especially areas near a bathroom   ? Keep the items you use often within reach on a small stool or end table   ? Use an assistive device such as walker or cane, as directed by provider/physical therapy   ? Use a non-slip mat and grab bars in your bathroom. Look for home health sections for best options     Other Areas to Focus On   ? Exercise and nutrition: Regular exercise or taking a falls prevention class are great ways improve strength and balance. Don’t forget to stay hydrated and bring a snack!   ? Medicine side effects: Some medicines can make you sleepy or dizzy, which could cause a fall. Ask your healthcare provider about the side effects your medicines could cause. Be sure to let them know if you take any vitamins or supplements as well.   ? Tripping hazards: Remove items you could trip on, such as loose mats, rugs, cords, and clutter. Wear closed toe shoes with rubber soles.   ? Health and wellness: Get regular checkups with your healthcare provider, plus routine vision and hearing screenings. Talk with your healthcare provider about:   o Your medicines and the possible side effects - bring them in a bag if that is easier!   o Problems with balance or feeling dizzy   o Ways to promote bone health, such as Vitamin D and calcium supplements   o Questions or concerns about falling     *Ask your healthcare team if you have questions     ©[facility], 2022

## 2025-07-15 NOTE — ASSESSMENT & PLAN NOTE
Well control no changes  Orders:    Comprehensive Metabolic Panel; Future    Lipid Panel; Future    TSH with reflex to Free T4 if abnormal; Future

## 2025-07-15 NOTE — PROGRESS NOTES
Subjective   Patient ID: Joseph Hayden is a 80 y.o. male who presents for Follow-up.BP, labs, weight, consults.    HPI He continues eliminating breads, cutting down on other starches,more vegetables and meats, less sweets. Able to loose a bit more weight  Lower extremities lymphedema in summer weather is stable.  He has fallen when changing position walking in the grass, suffered lacerations on knee and bruised his right leg only.  Review cardiologist consult. His A fib is paroxismal, continue eliquis. Heart rate in the 60. Aortic aneurism stable for 4 years. Pulmonary artery slight enlarged.  Review uro oncologist visit,offered surgical intervention 6 m. However  pt became asymptomatic since his stent was removed, currently slight incontinence for drips only, able to void in bathroom day time. He wants to avoid intervention unless symptomatic    Review of Systems   Musculoskeletal:         Bilateral hand , forearms weakness to open jars. Tristan lower extremities weakness   Neurological:  Negative for dizziness, tremors, light-headedness and headaches.   Psychiatric/Behavioral:  Negative for decreased concentration.        Objective   /84   Pulse 80   Resp 18   Wt 125 kg (275 lb)   SpO2 98%   BMI 40.61 kg/m²     Physical Exam  8 lb down  Eyes- Conjunctiva clear  Neck-no thyromegaly  Cardiac- regular rate and rhythm, no murmurs  Lung- clear to auscultation  GI- present  bowel sounds, nontender, no rebound  MSK- no deformities  Extremities- no edema, good distal pulses  Neuro- non focal, oriented x 3  Psychiatric- pleasant, well groomed, no hallucinations    Assessment/Plan   Assessment & Plan  Primary hypertension  Well control no changes  Orders:    Comprehensive Metabolic Panel; Future    Lipid Panel; Future    TSH with reflex to Free T4 if abnormal; Future    Aortic ectasia, thoracic  New ct angio chest to be done oct 2025       Hyperglycemia    Orders:    CBC; Future    Hemoglobin A1C;  Future    Hyperuricemia    Orders:    Uric acid; Future    Vitamin D deficiency, unspecified    Orders:    Vitamin D 25-Hydroxy,Total (for eval of Vitamin D levels); Future    Prostate cancer (Multi)    Orders:    PSA, total and free; Future    Ureteral mass  Pt prefers to avoid more surgery, since his hematuria has resolved, declines more testing            Patient was identified as a fall risk. Risk prevention instructions provided.

## 2025-07-16 LAB
25(OH)D3+25(OH)D2 SERPL-MCNC: 94 NG/ML (ref 30–100)
ALBUMIN SERPL-MCNC: 4.1 G/DL (ref 3.6–5.1)
ALBUMIN/GLOB SERPL: 1.3 (CALC) (ref 1–2.5)
ALP SERPL-CCNC: 59 U/L (ref 35–144)
ALT SERPL-CCNC: 11 U/L (ref 9–46)
AST SERPL-CCNC: 14 U/L (ref 10–35)
BILIRUB DIRECT SERPL-MCNC: 0.1 MG/DL
BILIRUB INDIRECT SERPL-MCNC: 0.4 MG/DL (CALC) (ref 0.2–1.2)
BILIRUB SERPL-MCNC: 0.5 MG/DL (ref 0.2–1.2)
CHOLEST SERPL-MCNC: 175 MG/DL
CHOLEST/HDLC SERPL: 4 (CALC)
ERYTHROCYTE [DISTWIDTH] IN BLOOD BY AUTOMATED COUNT: 15.5 % (ref 11–15)
EST. AVERAGE GLUCOSE BLD GHB EST-MCNC: 120 MG/DL
EST. AVERAGE GLUCOSE BLD GHB EST-SCNC: 6.6 MMOL/L
GLOBULIN SER CALC-MCNC: 3.1 G/DL (CALC) (ref 1.9–3.7)
HBA1C MFR BLD: 5.8 %
HCT VFR BLD AUTO: 40.6 % (ref 38.5–50)
HDLC SERPL-MCNC: 44 MG/DL
HGB BLD-MCNC: 12.8 G/DL (ref 13.2–17.1)
LDLC SERPL CALC-MCNC: 107 MG/DL (CALC)
MCH RBC QN AUTO: 30.8 PG (ref 27–33)
MCHC RBC AUTO-ENTMCNC: 31.5 G/DL (ref 32–36)
MCV RBC AUTO: 97.6 FL (ref 80–100)
NONHDLC SERPL-MCNC: 131 MG/DL (CALC)
PLATELET # BLD AUTO: 226 THOUSAND/UL (ref 140–400)
PMV BLD REES-ECKER: 9.5 FL (ref 7.5–12.5)
PROT SERPL-MCNC: 7.2 G/DL (ref 6.1–8.1)
PSA FREE MFR SERPL: 11 % (CALC)
PSA FREE SERPL-MCNC: 0.1 NG/ML
PSA SERPL-MCNC: 0.9 NG/ML
RBC # BLD AUTO: 4.16 MILLION/UL (ref 4.2–5.8)
TRIGL SERPL-MCNC: 129 MG/DL
TSH SERPL-ACNC: 1.19 MIU/L (ref 0.4–4.5)
URATE SERPL-MCNC: 6 MG/DL (ref 4–8)
WBC # BLD AUTO: 6.8 THOUSAND/UL (ref 3.8–10.8)

## 2025-08-18 DIAGNOSIS — R73.9 HYPERGLYCEMIA: ICD-10-CM

## 2025-08-18 DIAGNOSIS — I10 HYPERTENSION, UNSPECIFIED TYPE: ICD-10-CM

## 2025-08-18 DIAGNOSIS — E79.0 ASYMPTOMATIC HYPERURICEMIA: ICD-10-CM

## 2025-08-18 RX ORDER — METFORMIN HYDROCHLORIDE 500 MG/1
TABLET, EXTENDED RELEASE ORAL
Qty: 270 TABLET | Refills: 1 | Status: SHIPPED | OUTPATIENT
Start: 2025-08-18

## 2025-08-18 RX ORDER — AMLODIPINE BESYLATE 5 MG/1
5 TABLET ORAL DAILY
Qty: 90 TABLET | Refills: 1 | Status: SHIPPED | OUTPATIENT
Start: 2025-08-18

## 2025-08-18 RX ORDER — ALLOPURINOL 100 MG/1
200 TABLET ORAL DAILY
Qty: 180 TABLET | Refills: 1 | Status: SHIPPED | OUTPATIENT
Start: 2025-08-18

## 2026-01-15 ENCOUNTER — APPOINTMENT (OUTPATIENT)
Dept: PRIMARY CARE | Facility: CLINIC | Age: 81
End: 2026-01-15
Payer: MEDICARE

## (undated) DEVICE — Device

## (undated) DEVICE — MANIFOLD, 4 PORT NEPTUNE STANDARD

## (undated) DEVICE — TUBING, SUCTION, CONNECTING, STERILE 0.25 X 120 IN., LF

## (undated) DEVICE — TOWEL PACK, STERILE, 4/PACK, BLUE

## (undated) DEVICE — REST, HEAD, BAGEL, 9 IN

## (undated) DEVICE — COLLECTION BAG, FLUID, F/STERIS LOOP, STERILE

## (undated) DEVICE — GUIDEWIRE, ULTRA TRACK, HYBRID, 0.035 IN X 150CM

## (undated) DEVICE — CATHETER, URETERAL 10FR DUAL LUMEN

## (undated) DEVICE — CATHETER, URETERAL 5F, OPEN END

## (undated) DEVICE — COVER, CART, 45 X 27 X 48 IN, CLEAR